# Patient Record
Sex: FEMALE | Race: WHITE
[De-identification: names, ages, dates, MRNs, and addresses within clinical notes are randomized per-mention and may not be internally consistent; named-entity substitution may affect disease eponyms.]

---

## 2018-06-07 ENCOUNTER — HOSPITAL ENCOUNTER (OUTPATIENT)
Dept: HOSPITAL 53 - M SFHCPLAZ | Age: 23
End: 2018-06-07
Attending: INTERNAL MEDICINE
Payer: MEDICARE

## 2018-06-07 DIAGNOSIS — Z53.8: ICD-10-CM

## 2018-06-07 DIAGNOSIS — M46.46: Primary | ICD-10-CM

## 2018-08-16 ENCOUNTER — HOSPITAL ENCOUNTER (OUTPATIENT)
Dept: HOSPITAL 53 - M SFHCPLAZ | Age: 23
End: 2018-08-16
Attending: INTERNAL MEDICINE
Payer: COMMERCIAL

## 2018-08-16 DIAGNOSIS — M46.46: Primary | ICD-10-CM

## 2018-08-16 LAB
ALBUMIN/GLOBULIN RATIO: 1.17 (ref 1–1.93)
ALBUMIN: 3.5 GM/DL (ref 3.2–5.2)
ALKALINE PHOSPHATASE: 116 U/L (ref 45–117)
ALT SERPL W P-5'-P-CCNC: 26 U/L (ref 12–78)
ANION GAP: 10 MEQ/L (ref 8–16)
AST SERPL-CCNC: 20 U/L (ref 7–37)
BASO #: 0 10^3/UL (ref 0–0.2)
BASO %: 0.5 % (ref 0–1)
BILIRUBIN,TOTAL: 0.1 MG/DL (ref 0.2–1)
BLOOD UREA NITROGEN: 9 MG/DL (ref 7–18)
CALCIUM LEVEL: 8.1 MG/DL (ref 8.5–10.1)
CARBON DIOXIDE LEVEL: 20 MEQ/L (ref 21–32)
CHLORIDE LEVEL: 114 MEQ/L (ref 98–107)
CREATININE FOR GFR: 0.79 MG/DL (ref 0.55–1.3)
CRP SERPL-MCNC: 0.75 MG/DL (ref 0–0.3)
EOS #: 0.5 10^3/UL (ref 0–0.5)
EOSINOPHIL NFR BLD AUTO: 6.8 % (ref 0–3)
ERYTHROCYTE SEDIMENTATION RATE: 10 MM/HR (ref 0–20)
GFR SERPL CREATININE-BSD FRML MDRD: > 60 ML/MIN/{1.73_M2} (ref 60–?)
GLUCOSE, FASTING: 99 MG/DL (ref 70–100)
HEMATOCRIT: 39.7 % (ref 36–47)
HEMOGLOBIN: 12.4 G/DL (ref 12–15.5)
IMMATURE GRANULOCYTE %: 0.4 % (ref 0–3)
LYMPH #: 2.2 10^3/UL (ref 1.5–6.5)
LYMPH %: 28.7 % (ref 24–44)
MEAN CORPUSCULAR HEMOGLOBIN: 26.4 PG (ref 27–33)
MEAN CORPUSCULAR HGB CONC: 31.2 G/DL (ref 32–36.5)
MEAN CORPUSCULAR VOLUME: 84.5 FL (ref 80–96)
MONO #: 0.4 10^3/UL (ref 0–0.8)
MONO %: 4.7 % (ref 0–5)
NEUTROPHILS #: 4.4 10^3/UL (ref 1.8–7.7)
NEUTROPHILS %: 58.9 % (ref 36–66)
NRBC BLD AUTO-RTO: 0 % (ref 0–0)
PLATELET COUNT, AUTOMATED: 222 10^3/UL (ref 150–450)
POTASSIUM SERUM: 3.6 MEQ/L (ref 3.5–5.1)
RED BLOOD COUNT: 4.7 10^6/UL (ref 4–5.4)
RED CELL DISTRIBUTION WIDTH: 15.3 % (ref 11.5–14.5)
SODIUM LEVEL: 144 MEQ/L (ref 136–145)
TOTAL PROTEIN: 6.5 GM/DL (ref 6.4–8.2)
WHITE BLOOD COUNT: 7.5 10^3/UL (ref 4–10)

## 2018-08-16 PROCEDURE — 80053 COMPREHEN METABOLIC PANEL: CPT

## 2022-06-26 ENCOUNTER — HISTORICAL (OUTPATIENT)
Dept: ADMINISTRATIVE | Facility: HOSPITAL | Age: 27
End: 2022-06-26

## 2022-07-06 ENCOUNTER — HISTORICAL (OUTPATIENT)
Dept: ADMINISTRATIVE | Facility: HOSPITAL | Age: 27
End: 2022-07-06

## 2022-07-09 ENCOUNTER — HOSPITAL ENCOUNTER (EMERGENCY)
Facility: HOSPITAL | Age: 27
Discharge: HOME | End: 2022-07-09
Attending: STUDENT IN AN ORGANIZED HEALTH CARE EDUCATION/TRAINING PROGRAM
Payer: MEDICAID

## 2022-07-09 VITALS
OXYGEN SATURATION: 98 % | HEIGHT: 62 IN | WEIGHT: 107.13 LBS | DIASTOLIC BLOOD PRESSURE: 78 MMHG | BODY MASS INDEX: 19.71 KG/M2 | RESPIRATION RATE: 16 BRPM | HEART RATE: 81 BPM | TEMPERATURE: 98 F | SYSTOLIC BLOOD PRESSURE: 134 MMHG

## 2022-07-09 DIAGNOSIS — N94.6 DYSMENORRHEA: Primary | ICD-10-CM

## 2022-07-09 LAB
ALBUMIN SERPL-MCNC: 4.3 GM/DL (ref 3.5–5)
ALBUMIN/GLOB SERPL: 1.4 RATIO (ref 1.1–2)
ALP SERPL-CCNC: 64 UNIT/L (ref 40–150)
ALT SERPL-CCNC: 12 UNIT/L (ref 0–55)
APPEARANCE UR: CLEAR
AST SERPL-CCNC: 15 UNIT/L (ref 5–34)
B-HCG SERPL QL: NEGATIVE
BACTERIA #/AREA URNS AUTO: ABNORMAL /HPF
BASOPHILS # BLD AUTO: 0.03 X10(3)/MCL (ref 0–0.2)
BASOPHILS NFR BLD AUTO: 0.4 %
BILIRUB UR QL STRIP.AUTO: NEGATIVE MG/DL
BILIRUBIN DIRECT+TOT PNL SERPL-MCNC: 0.6 MG/DL
BUN SERPL-MCNC: 8 MG/DL (ref 7–18.7)
CALCIUM SERPL-MCNC: 9.8 MG/DL (ref 8.4–10.2)
CHLORIDE SERPL-SCNC: 108 MMOL/L (ref 98–107)
CO2 SERPL-SCNC: 22 MMOL/L (ref 22–29)
COLOR UR AUTO: YELLOW
CREAT SERPL-MCNC: 0.66 MG/DL (ref 0.55–1.02)
EOSINOPHIL # BLD AUTO: 0.24 X10(3)/MCL (ref 0–0.9)
EOSINOPHIL NFR BLD AUTO: 3.1 %
ERYTHROCYTE [DISTWIDTH] IN BLOOD BY AUTOMATED COUNT: 11.5 % (ref 11.5–17)
GLOBULIN SER-MCNC: 3 GM/DL (ref 2.4–3.5)
GLUCOSE SERPL-MCNC: 95 MG/DL (ref 74–100)
GLUCOSE UR QL STRIP.AUTO: NEGATIVE MG/DL
HCT VFR BLD AUTO: 37.5 % (ref 37–47)
HGB BLD-MCNC: 12.6 GM/DL (ref 12–16)
IMM GRANULOCYTES # BLD AUTO: 0.02 X10(3)/MCL (ref 0–0.04)
IMM GRANULOCYTES NFR BLD AUTO: 0.3 %
KETONES UR QL STRIP.AUTO: ABNORMAL MG/DL
LEUKOCYTE ESTERASE UR QL STRIP.AUTO: NEGATIVE UNIT/L
LYMPHOCYTES # BLD AUTO: 2.8 X10(3)/MCL (ref 0.6–4.6)
LYMPHOCYTES NFR BLD AUTO: 36 %
MCH RBC QN AUTO: 30.7 PG (ref 27–31)
MCHC RBC AUTO-ENTMCNC: 33.6 MG/DL (ref 33–36)
MCV RBC AUTO: 91.5 FL (ref 80–94)
MONOCYTES # BLD AUTO: 0.67 X10(3)/MCL (ref 0.1–1.3)
MONOCYTES NFR BLD AUTO: 8.6 %
NEUTROPHILS # BLD AUTO: 4 X10(3)/MCL (ref 2.1–9.2)
NEUTROPHILS NFR BLD AUTO: 51.6 %
NITRITE UR QL STRIP.AUTO: NEGATIVE
PH UR STRIP.AUTO: 5.5 [PH]
PLATELET # BLD AUTO: 367 X10(3)/MCL (ref 130–400)
PMV BLD AUTO: 8.8 FL (ref 7.4–10.4)
POTASSIUM SERPL-SCNC: 3.4 MMOL/L (ref 3.5–5.1)
PROT SERPL-MCNC: 7.3 GM/DL (ref 6.4–8.3)
PROT UR QL STRIP.AUTO: NEGATIVE MG/DL
RBC # BLD AUTO: 4.1 X10(6)/MCL (ref 4.2–5.4)
RBC #/AREA URNS AUTO: ABNORMAL /HPF
RBC UR QL AUTO: ABNORMAL UNIT/L
SODIUM SERPL-SCNC: 140 MMOL/L (ref 136–145)
SP GR UR STRIP.AUTO: 1.01
SQUAMOUS #/AREA URNS AUTO: ABNORMAL /HPF
UROBILINOGEN UR STRIP-ACNC: 0.2 MG/DL
WBC # SPEC AUTO: 7.8 X10(3)/MCL (ref 4.5–11.5)
WBC #/AREA URNS AUTO: ABNORMAL /HPF

## 2022-07-09 PROCEDURE — 99285 EMERGENCY DEPT VISIT HI MDM: CPT | Mod: 25

## 2022-07-09 PROCEDURE — 81001 URINALYSIS AUTO W/SCOPE: CPT | Performed by: STUDENT IN AN ORGANIZED HEALTH CARE EDUCATION/TRAINING PROGRAM

## 2022-07-09 PROCEDURE — 63600175 PHARM REV CODE 636 W HCPCS: Performed by: STUDENT IN AN ORGANIZED HEALTH CARE EDUCATION/TRAINING PROGRAM

## 2022-07-09 PROCEDURE — 96372 THER/PROPH/DIAG INJ SC/IM: CPT | Performed by: STUDENT IN AN ORGANIZED HEALTH CARE EDUCATION/TRAINING PROGRAM

## 2022-07-09 PROCEDURE — 80053 COMPREHEN METABOLIC PANEL: CPT | Performed by: STUDENT IN AN ORGANIZED HEALTH CARE EDUCATION/TRAINING PROGRAM

## 2022-07-09 PROCEDURE — 81025 URINE PREGNANCY TEST: CPT | Performed by: STUDENT IN AN ORGANIZED HEALTH CARE EDUCATION/TRAINING PROGRAM

## 2022-07-09 PROCEDURE — 85025 COMPLETE CBC W/AUTO DIFF WBC: CPT | Performed by: STUDENT IN AN ORGANIZED HEALTH CARE EDUCATION/TRAINING PROGRAM

## 2022-07-09 PROCEDURE — 36415 COLL VENOUS BLD VENIPUNCTURE: CPT | Performed by: STUDENT IN AN ORGANIZED HEALTH CARE EDUCATION/TRAINING PROGRAM

## 2022-07-09 RX ORDER — KETOROLAC TROMETHAMINE 30 MG/ML
30 INJECTION, SOLUTION INTRAMUSCULAR; INTRAVENOUS
Status: COMPLETED | OUTPATIENT
Start: 2022-07-09 | End: 2022-07-09

## 2022-07-09 RX ADMIN — KETOROLAC TROMETHAMINE 30 MG: 30 INJECTION, SOLUTION INTRAMUSCULAR; INTRAVENOUS at 09:07

## 2022-07-09 NOTE — ED PROVIDER NOTES
Encounter Date: 7/9/2022       History     Chief Complaint   Patient presents with    Vaginal Bleeding     Pt c/o vaginal bleeding that started with spotting 3 days ago and now having heavy vaginal bleeding. Pt also has generalized body aches since last night.     The history is provided by the patient.   Vaginal Bleeding  This is a new problem. The current episode started yesterday. The problem occurs constantly. The problem has been gradually improving. Pertinent negatives include no chest pain, no abdominal pain, no headaches and no shortness of breath. Nothing aggravates the symptoms. Nothing relieves the symptoms.     26-year-old female with a past medical history tubal ligation presents emergency department for vaginal bleeding.  Patient states that she had a menstrual cycle approximately 4 weeks ago.  States that 3 days ago she started spotting and then passed a large clot/tissue this morning.  States that she has been having normal vaginal bleeding since then for a cycle for her.  States she has been using 1-2 pads per hour.  She also complains of generalized body aches but thinks it is because she is menstruating.  No fevers or chills.    Review of patient's allergies indicates:  No Known Allergies  No past medical history on file.  Past Surgical History:   Procedure Laterality Date    BILATERAL TUBAL LIGATION       No family history on file.     Review of Systems   Constitutional: Positive for fatigue. Negative for fever.   Respiratory: Negative for shortness of breath.    Cardiovascular: Negative for chest pain.   Gastrointestinal: Negative for abdominal pain.   Genitourinary: Positive for vaginal bleeding.   Neurological: Negative for headaches.   All other systems reviewed and are negative.      Physical Exam     Initial Vitals [07/09/22 0806]   BP Pulse Resp Temp SpO2   (!) 134/97 110 20 97.9 °F (36.6 °C) 100 %      MAP       --         Physical Exam    Nursing note and vitals  reviewed.  Constitutional: She appears well-developed and well-nourished.   Cardiovascular: Normal rate and regular rhythm.   Pulmonary/Chest: Breath sounds normal. No respiratory distress.   Abdominal: Abdomen is soft. Bowel sounds are normal.   Genitourinary: There is no rash or tenderness on the right labia. There is no tenderness, lesion or injury on the left labia.    Vaginal bleeding present.      No vaginal tenderness.   There is bleeding in the vagina. No tenderness in the vagina.    No signs of injury in the vagina.     Musculoskeletal:         General: No tenderness. Normal range of motion.     Neurological: She is alert.   Skin: Skin is warm. Capillary refill takes less than 2 seconds.   Psychiatric: She has a normal mood and affect. Thought content normal.         ED Course   Procedures  Labs Reviewed   URINALYSIS, REFLEX TO URINE CULTURE - Abnormal; Notable for the following components:       Result Value    Ketones, UA Trace (*)     Blood, UA Large (*)     All other components within normal limits   COMPREHENSIVE METABOLIC PANEL - Abnormal; Notable for the following components:    Potassium Level 3.4 (*)     Chloride 108 (*)     All other components within normal limits   CBC WITH DIFFERENTIAL - Abnormal; Notable for the following components:    RBC 4.10 (*)     All other components within normal limits   URINALYSIS, MICROSCOPIC - Abnormal; Notable for the following components:    RBC, UA 6-10 (*)     Squamous Epithelial Cells, UA Few (*)     All other components within normal limits   PREGNANCY TEST, URINE RAPID - Normal   CBC W/ AUTO DIFFERENTIAL    Narrative:     The following orders were created for panel order CBC auto differential.  Procedure                               Abnormality         Status                     ---------                               -----------         ------                     CBC with Differential[[id number]]        Abnormal            Final result                  Please view results for these tests on the individual orders.          Imaging Results          US Pelvis Complete Non OB (Final result)  Result time 22 11:49:26    Final result by Greg Zabala MD (22 11:49:26)                 Impression:      1. Nonspecific, small volume fluid within the endometrial canal.      Electronically signed by: Greg Zabala MD  Date:    2022  Time:    11:49             Narrative:    EXAMINATION:  US PELVIS COMPLETE NON OB    CLINICAL HISTORY:  AUB;    TECHNIQUE:  Multiple grayscale and color Doppler images of the pelvis were obtained utilizing the transabdominal and transvaginal technique.  Spectral Doppler evaluation of both ovaries was performed.    COMPARISON:  None    FINDINGS:  Uterus:    Anteverted measuring 7.5 x 3.6 x 4.5 cm.    Bilaminar thickness of the endometrium measures 2 mm    Small amount of fluid is present within the uterine cavity.    Uterine parenchyma appears normal.    Right ovary:    Measures 3.3 x 1.7 x 2.3 cm.    Normal sonographic appearance.    Spectral Doppler evaluation demonstrates normal arteriovenous waveforms.    Left ovary:    Measures 3.3 x 1.4 x 2.6 cm.    Normal sonographic appearance.    Spectral Doppler evaluation demonstrates normal arterial and venous waveforms.    Other:    No free fluid in the cul-de-sac.                                 Medications   ketorolac injection 30 mg (30 mg Intramuscular Given 22 0945)        Additional MDM:   Differential Diagnosis:   Symptom: Abdominal pain. <> The follow diagnoses were considered and will be evaluated: Dysfunction Uterine Bleeding, Dysmenorrhea, Endometriosis, Threatened  and Uterine Fibroids.               ED Course as of 22 1155   Sat 2022   1154 Patient resting comfortably in bed no acute distress.  Vital signs stable.  Ultrasound within normal limits.  Blood work okay.  Patient states pain is improved after Toradol.  Okay to discharge.   Follow-up with PCP. [BS]      ED Course User Index  [BS] Chaz Dan MD             Clinical Impression:   Final diagnoses:  [N94.6] Dysmenorrhea (Primary)          ED Disposition Condition    Discharge Stable        ED Prescriptions     None        Follow-up Information     Follow up With Specialties Details Why Contact Info    [facility] Schedule an appointment as soon as possible for a visit   [address] [address]  [phone]      [facility] - Emergency Dept Emergency Medicine Go to  If symptoms worsen [address] [facility] [id number] [phone]           Chaz Dan MD  07/09/22 8047

## 2022-08-18 LAB
PAP RECOMMENDATION EXT: NORMAL
PAP SMEAR: NORMAL

## 2023-01-13 ENCOUNTER — DOCUMENTATION ONLY (OUTPATIENT)
Dept: ADMINISTRATIVE | Facility: HOSPITAL | Age: 28
End: 2023-01-13
Payer: MEDICAID

## 2023-04-25 ENCOUNTER — HOSPITAL ENCOUNTER (OUTPATIENT)
Dept: RADIOLOGY | Facility: HOSPITAL | Age: 28
Discharge: HOME | End: 2023-04-25
Attending: NURSE PRACTITIONER
Payer: MEDICAID

## 2023-04-25 VITALS — BODY MASS INDEX: 21.26 KG/M2 | HEIGHT: 63 IN | WEIGHT: 120 LBS

## 2023-04-25 DIAGNOSIS — N63.21 BREAST LUMP ON LEFT SIDE AT 1 O'CLOCK POSITION: ICD-10-CM

## 2023-04-25 DIAGNOSIS — N63.11 BREAST LUMP ON RIGHT SIDE AT 10 O'CLOCK POSITION: ICD-10-CM

## 2023-04-25 PROCEDURE — 77062 BREAST TOMOSYNTHESIS BI: CPT | Mod: TC

## 2023-04-25 PROCEDURE — 76641 ULTRASOUND BREAST COMPLETE: CPT | Mod: TC,50

## 2023-10-14 ENCOUNTER — HOSPITAL ENCOUNTER (EMERGENCY)
Facility: HOSPITAL | Age: 28
Discharge: HOME | End: 2023-10-14
Attending: FAMILY MEDICINE
Payer: MEDICAID

## 2023-10-14 VITALS
WEIGHT: 118 LBS | BODY MASS INDEX: 20.91 KG/M2 | HEART RATE: 98 BPM | RESPIRATION RATE: 18 BRPM | DIASTOLIC BLOOD PRESSURE: 91 MMHG | SYSTOLIC BLOOD PRESSURE: 135 MMHG | HEIGHT: 63 IN | TEMPERATURE: 98 F | OXYGEN SATURATION: 99 %

## 2023-10-14 DIAGNOSIS — N30.01 ACUTE CYSTITIS WITH HEMATURIA: Primary | ICD-10-CM

## 2023-10-14 LAB
APPEARANCE UR: ABNORMAL
B-HCG SERPL QL: NEGATIVE
BACTERIA #/AREA URNS AUTO: ABNORMAL /HPF
BILIRUB UR QL STRIP.AUTO: NEGATIVE
COLOR UR AUTO: YELLOW
GLUCOSE UR QL STRIP.AUTO: NEGATIVE
KETONES UR QL STRIP.AUTO: NEGATIVE
LEUKOCYTE ESTERASE UR QL STRIP.AUTO: ABNORMAL
NITRITE UR QL STRIP.AUTO: NEGATIVE
PH UR STRIP.AUTO: 6 [PH]
PROT UR QL STRIP.AUTO: NEGATIVE
RBC #/AREA URNS AUTO: ABNORMAL /HPF
RBC UR QL AUTO: ABNORMAL
SP GR UR STRIP.AUTO: 1.02 (ref 1–1.03)
SQUAMOUS #/AREA URNS AUTO: ABNORMAL /HPF
UROBILINOGEN UR STRIP-ACNC: 0.2
WBC #/AREA URNS AUTO: ABNORMAL /HPF

## 2023-10-14 PROCEDURE — 81025 URINE PREGNANCY TEST: CPT | Performed by: NURSE PRACTITIONER

## 2023-10-14 PROCEDURE — 99283 EMERGENCY DEPT VISIT LOW MDM: CPT

## 2023-10-14 PROCEDURE — 87088 URINE BACTERIA CULTURE: CPT | Performed by: NURSE PRACTITIONER

## 2023-10-14 PROCEDURE — 87077 CULTURE AEROBIC IDENTIFY: CPT | Performed by: NURSE PRACTITIONER

## 2023-10-14 PROCEDURE — 81001 URINALYSIS AUTO W/SCOPE: CPT | Performed by: NURSE PRACTITIONER

## 2023-10-14 RX ORDER — NITROFURANTOIN 25; 75 MG/1; MG/1
100 CAPSULE ORAL 2 TIMES DAILY
Qty: 6 CAPSULE | Refills: 0 | Status: SHIPPED | OUTPATIENT
Start: 2023-10-14 | End: 2023-10-17

## 2023-10-14 NOTE — DISCHARGE INSTRUCTIONS
Take meds as directed  Increase water intake  Urinate after intercourse  Avoid bubble bathes  Women, wipe from to back after urinating

## 2023-10-14 NOTE — ED PROVIDER NOTES
Encounter Date: 10/14/2023       History     Chief Complaint   Patient presents with    Dysuria     Pt report intermittent burning during urination x 1 week. Denies any lower back pain or abnormal vaginal discharge.     The history is provided by the patient.   Dysuria   This is a new problem. The current episode started several days ago. The problem occurs every urination. The problem has been unchanged. The quality of the pain is described as burning. She is Sexually active. There is No history of pyelonephritis. Pertinent negatives include no frequency, no hematuria, no urgency and no flank pain. She has tried nothing for the symptoms.     Review of patient's allergies indicates:  No Known Allergies  History reviewed. No pertinent past medical history.  Past Surgical History:   Procedure Laterality Date    BILATERAL TUBAL LIGATION       Family History   Problem Relation Age of Onset    Breast cancer Paternal Aunt     Breast cancer Paternal Grandmother      Social History     Tobacco Use    Smoking status: Every Day     Types: Vaping with nicotine    Smokeless tobacco: Never   Substance Use Topics    Alcohol use: Not Currently    Drug use: Not Currently     Review of Systems   Constitutional:  Negative for fatigue.   Cardiovascular:  Negative for chest pain and palpitations.   Genitourinary:  Positive for dysuria. Negative for decreased urine volume, difficulty urinating, dyspareunia, flank pain, frequency, hematuria, pelvic pain and urgency.       Physical Exam     Initial Vitals [10/14/23 1758]   BP Pulse Resp Temp SpO2   (!) 135/91 98 18 97.6 °F (36.4 °C) 99 %      MAP       --         Physical Exam    Nursing note and vitals reviewed.  Constitutional: She appears well-developed and well-nourished. No distress.   Eyes: EOM are normal. Pupils are equal, round, and reactive to light.   Neck: Neck supple.   Normal range of motion.  Cardiovascular:  Normal rate and regular rhythm.     Exam reveals no gallop and no  friction rub.       No murmur heard.  Pulmonary/Chest: Breath sounds normal. No respiratory distress.   Abdominal: Abdomen is soft. Bowel sounds are normal. There is no abdominal tenderness. There is no rebound and no guarding.   Musculoskeletal:         General: Normal range of motion.      Cervical back: Normal range of motion and neck supple.     Neurological: She is alert and oriented to person, place, and time.   Skin: Skin is warm and dry.   Psychiatric: She has a normal mood and affect. Her behavior is normal. Judgment and thought content normal.         ED Course   Procedures  Labs Reviewed   URINALYSIS - Abnormal; Notable for the following components:       Result Value    Appearance, UA Cloudy (*)     Blood, UA Moderate (*)     Leukocyte Esterase, UA Moderate (*)     All other components within normal limits    Narrative:      URINE STABILITY IS 2 HOURS AT ROOM TEMP OR    SIX HOURS REFRIGERATED. PERFORMING TESTING ON    SPECIMENS GREATER THAN THIS AGE MAY AFFECT THE    FOLLOWING TESTS:    PH          SPECIFIC GRAVITY           BLOOD    CLARITY     BILIRUBIN               UROBILINOGEN   HCG QUALITATIVE URINE - Normal   URINALYSIS, MICROSCOPIC          Imaging Results    None          Medications - No data to display  Medical Decision Making  Amount and/or Complexity of Data Reviewed  Labs: ordered.               ED Course as of 10/14/23 1829   Sat Oct 14, 2023   1822 Urinalysis(!)  Blood and leukocytes in urine; no protein, glucose, ketones, or nitrites.    [HB]   1827 HCG Qualitative Urine  Negative upt   [HB]      ED Course User Index  [HB] DONNIE Villaseñor, SHONAP-C                    Clinical Impression:   Final diagnoses:  [N30.01] Acute cystitis with hematuria (Primary)        ED Disposition Condition    Discharge Stable          ED Prescriptions       Medication Sig Dispense Start Date End Date Auth. Provider    nitrofurantoin, macrocrystal-monohydrate, (MACROBID) 100 MG capsule Take 1 capsule (100 mg  total) by mouth 2 (two) times daily. for 3 days 6 capsule 10/14/2023 10/17/2023 DONNIE Villaseñor FNP-C          Follow-up Information       Follow up With Specialties Details Why Contact Info    Riya Abbasi FNP-C Family Medicine Call  As needed [address]  [facility] [address] [phone]      [facility] Emergency Medicine  If symptoms worsen [address] [facility] [address] [phone]             DONNIE Villaseñor FNP-C  10/14/23 0567

## 2023-10-17 LAB — BACTERIA UR CULT: ABNORMAL

## 2023-10-21 ENCOUNTER — HOSPITAL ENCOUNTER (EMERGENCY)
Facility: HOSPITAL | Age: 28
Discharge: HOME | End: 2023-10-21
Attending: INTERNAL MEDICINE
Payer: MEDICAID

## 2023-10-21 VITALS
HEART RATE: 89 BPM | SYSTOLIC BLOOD PRESSURE: 128 MMHG | OXYGEN SATURATION: 99 % | TEMPERATURE: 98 F | RESPIRATION RATE: 18 BRPM | DIASTOLIC BLOOD PRESSURE: 85 MMHG | BODY MASS INDEX: 21.41 KG/M2 | HEIGHT: 63 IN | WEIGHT: 120.81 LBS

## 2023-10-21 DIAGNOSIS — N39.0 URINARY TRACT INFECTION WITHOUT HEMATURIA, SITE UNSPECIFIED: ICD-10-CM

## 2023-10-21 DIAGNOSIS — K59.00 CONSTIPATION, UNSPECIFIED CONSTIPATION TYPE: Primary | ICD-10-CM

## 2023-10-21 LAB
ALBUMIN SERPL-MCNC: 3.8 G/DL (ref 3.5–5)
ALBUMIN/GLOB SERPL: 1.1 RATIO (ref 1.1–2)
ALP SERPL-CCNC: 66 UNIT/L (ref 40–150)
ALT SERPL-CCNC: 14 UNIT/L (ref 0–55)
APPEARANCE UR: CLEAR
AST SERPL-CCNC: 16 UNIT/L (ref 5–34)
B-HCG SERPL QL: NEGATIVE
BACTERIA #/AREA URNS AUTO: ABNORMAL /HPF
BASOPHILS # BLD AUTO: 0.03 X10(3)/MCL
BASOPHILS NFR BLD AUTO: 0.5 %
BILIRUB SERPL-MCNC: 0.4 MG/DL
BILIRUB UR QL STRIP.AUTO: NEGATIVE
BUN SERPL-MCNC: 12 MG/DL (ref 7–18.7)
CALCIUM SERPL-MCNC: 9.4 MG/DL (ref 8.4–10.2)
CHLORIDE SERPL-SCNC: 105 MMOL/L (ref 98–107)
CO2 SERPL-SCNC: 28 MMOL/L (ref 22–29)
COLOR UR AUTO: YELLOW
CREAT SERPL-MCNC: 0.79 MG/DL (ref 0.55–1.02)
EOSINOPHIL # BLD AUTO: 0.29 X10(3)/MCL (ref 0–0.9)
EOSINOPHIL NFR BLD AUTO: 4.4 %
ERYTHROCYTE [DISTWIDTH] IN BLOOD BY AUTOMATED COUNT: 12 % (ref 11.5–17)
GFR SERPLBLD CREATININE-BSD FMLA CKD-EPI: >60 MLS/MIN/1.73/M2
GLOBULIN SER-MCNC: 3.4 GM/DL (ref 2.4–3.5)
GLUCOSE SERPL-MCNC: 68 MG/DL (ref 74–100)
GLUCOSE UR QL STRIP.AUTO: NEGATIVE
HCT VFR BLD AUTO: 41.3 % (ref 37–47)
HGB BLD-MCNC: 13.8 G/DL (ref 12–16)
IMM GRANULOCYTES # BLD AUTO: 0.01 X10(3)/MCL (ref 0–0.04)
IMM GRANULOCYTES NFR BLD AUTO: 0.2 %
KETONES UR QL STRIP.AUTO: NEGATIVE
LEUKOCYTE ESTERASE UR QL STRIP.AUTO: ABNORMAL
LIPASE SERPL-CCNC: 12 U/L
LYMPHOCYTES # BLD AUTO: 2.97 X10(3)/MCL (ref 0.6–4.6)
LYMPHOCYTES NFR BLD AUTO: 45.1 %
MCH RBC QN AUTO: 31.5 PG (ref 27–31)
MCHC RBC AUTO-ENTMCNC: 33.4 G/DL (ref 33–36)
MCV RBC AUTO: 94.3 FL (ref 80–94)
MONOCYTES # BLD AUTO: 0.73 X10(3)/MCL (ref 0.1–1.3)
MONOCYTES NFR BLD AUTO: 11.1 %
NEUTROPHILS # BLD AUTO: 2.55 X10(3)/MCL (ref 2.1–9.2)
NEUTROPHILS NFR BLD AUTO: 38.7 %
NITRITE UR QL STRIP.AUTO: NEGATIVE
PH UR STRIP.AUTO: 5.5 [PH]
PLATELET # BLD AUTO: 341 X10(3)/MCL (ref 130–400)
PMV BLD AUTO: 8.4 FL (ref 7.4–10.4)
POTASSIUM SERPL-SCNC: 3.7 MMOL/L (ref 3.5–5.1)
PROT SERPL-MCNC: 7.2 GM/DL (ref 6.4–8.3)
PROT UR QL STRIP.AUTO: NEGATIVE
RBC # BLD AUTO: 4.38 X10(6)/MCL (ref 4.2–5.4)
RBC #/AREA URNS AUTO: ABNORMAL /HPF
RBC UR QL AUTO: ABNORMAL
SODIUM SERPL-SCNC: 139 MMOL/L (ref 136–145)
SP GR UR STRIP.AUTO: 1.01 (ref 1–1.03)
SQUAMOUS #/AREA URNS AUTO: ABNORMAL /HPF
UROBILINOGEN UR STRIP-ACNC: 1
WBC # SPEC AUTO: 6.58 X10(3)/MCL (ref 4.5–11.5)
WBC #/AREA URNS AUTO: ABNORMAL /HPF

## 2023-10-21 PROCEDURE — 85025 COMPLETE CBC W/AUTO DIFF WBC: CPT | Performed by: INTERNAL MEDICINE

## 2023-10-21 PROCEDURE — 81001 URINALYSIS AUTO W/SCOPE: CPT | Performed by: INTERNAL MEDICINE

## 2023-10-21 PROCEDURE — 83690 ASSAY OF LIPASE: CPT | Performed by: INTERNAL MEDICINE

## 2023-10-21 PROCEDURE — 80053 COMPREHEN METABOLIC PANEL: CPT | Performed by: INTERNAL MEDICINE

## 2023-10-21 PROCEDURE — 99284 EMERGENCY DEPT VISIT MOD MDM: CPT | Mod: 25

## 2023-10-21 PROCEDURE — 81025 URINE PREGNANCY TEST: CPT | Performed by: INTERNAL MEDICINE

## 2023-10-21 RX ORDER — BUPRENORPHINE HYDROCHLORIDE 8 MG/1
TABLET SUBLINGUAL
COMMUNITY
Start: 2023-09-18

## 2023-10-21 RX ORDER — CEFDINIR 300 MG/1
300 CAPSULE ORAL 2 TIMES DAILY
Qty: 14 CAPSULE | Refills: 0 | Status: SHIPPED | OUTPATIENT
Start: 2023-10-21 | End: 2023-10-28

## 2023-10-21 RX ORDER — POLYETHYLENE GLYCOL 3350 17 G/17G
17 POWDER, FOR SOLUTION ORAL DAILY
Qty: 595 G | Refills: 0 | Status: SHIPPED | OUTPATIENT
Start: 2023-10-21

## 2023-10-21 NOTE — ED PROVIDER NOTES
10/21/2023         1:19 AM    Source of History:  History obtained from the patient.     Chief complaint:  From Nurse Triage:  Abdominal Pain (Pt states that she has been having some sharp pains and felt like something was in her abdomen. Pt was seen at [facility] and they told her it may be a cyst and she had slight blood in her urine. Pt states that since yesterday she had been having a burning, acid reflux like sensation in her upper abdomen and felt like her throat burns. Pt denies pain during urination and doesn't notice blood in her urine. Also having frequent urinatin.)    HISTORY OF PRESENT ILLNES:  Christen Hawthorne is a 27 y.o. female  has a past medical history of Anxiety disorder, unspecified, Depression, and Fibromyalgia. presenting with Abdominal Pain (Pt states that she has been having some sharp pains and felt like something was in her abdomen. Pt was seen at [facility] and they told her it may be a cyst and she had slight blood in her urine. Pt states that since yesterday she had been having a burning, acid reflux like sensation in her upper abdomen and felt like her throat burns. Pt denies pain during urination and doesn't notice blood in her urine. Also having frequent urinatin.)      REVIEW OF SYSTEMS:   Constitutional symptoms:  No Fever. No Chills    Skin symptoms:  No Rash.    Eye symptoms:  No Visual disturbance reported.   ENMT symptoms:  No Sore throat, told burns   Respiratory symptoms:  No Shortness of Breath, no Cough, no Wheezing.    Cardiovascular symptoms:  No Chest Pain, No Palpitations.   Gastrointestinal symptoms:  Multiple areas where she has Abdominal Pain but does not last, it just comes and goes, makes her have burning sensation, and gas and then goes away., No Nausea, No Vomiting, No Diarrhea, No Constipation.    Genitourinary symptoms:  No Dysuria, frequency of micturition,  Musculoskeletal symptoms:  No Back pain,    Neurologic symptoms:  No Headache, No Dizziness.     Psychiatric symptoms:  No Anxiety, No Depression, No Substance Abuse.              Additional review of systems information: Patient Denies Any Other Complaints.    All Other Systems Reviewed With Patient And Negative.    ALLEGIES:  Review of patient's allergies indicates:  No Known Allergies    MEDICINE LIST:  Current Outpatient Medications   Medication Instructions    buprenorphine HCL (SUBUTEX) 8 mg Subl SMARTSI.5 Tablet(s) Sublingual Every Morning    cefdinir (OMNICEF) 300 mg, Oral, 2 times daily    polyethylene glycol (GLYCOLAX) 17 g, Oral, Daily        PMH:  As per HPI and below:    Reviewed and updated in chart.    PAST MEDICAL HISTORY:  Past Medical History:   Diagnosis Date    Anxiety disorder, unspecified     Depression     Fibromyalgia         PAST SURGICAL HISTORY:  Past Surgical History:   Procedure Laterality Date    BILATERAL TUBAL LIGATION         SOCIAL HISTORY:  Social History     Tobacco Use    Smoking status: Every Day     Types: Vaping with nicotine    Smokeless tobacco: Never   Substance Use Topics    Alcohol use: Not Currently    Drug use: Not Currently       FAMILY HISTORY:  Family History   Problem Relation Age of Onset    Hypothyroidism Mother     Hypertension Mother     Hypothyroidism Father     Breast cancer Paternal Aunt     Breast cancer Paternal Grandmother         PROBLEM LIST:  There is no problem list on file for this patient.       PHYSICAL EXAM:      ED Triage Vitals [10/21/23 0041]   /89   Pulse 97   Resp 18   Temp 97.6 °F (36.4 °C)   SpO2 100 %        Vital Signs: Reviewed As In Chart.  General:  Alert, No Cardiorespiratory Distress Noted.   Eye:  Extraocular Movements Are Intact.   ENT: Mucus membranes are moist.   Cardiovascular:  Regular Rate And Rhythm, No Murmur, No Pedal Edema.    Respiratory:  Respirations Nonlabored, No Respiratory Distress, Good Bilateral Air Entry, No Rales, No Rhonchi.    Gastrointestinal:  Soft, Non Distended, Non Tenderness, Normal Bowel  Sounds.    Neurological:  Alert And Oriented To Person, Place, Time, And Situation, Normal Motor Observed, Normal Speech Observed.  Musculoskeletal:  No Gross Deformity Noted.     Psychiatric:  Cooperative.      ED WORKUP FOR MEDICAL DECISION MAKING:    ED ORDERS:  Orders Placed This Encounter   Procedures    CT Renal Stone Study ABD Pelvis WO    Urinalysis, Reflex to Urine Culture    Pregnancy, urine rapid    Urinalysis, Microscopic    Comprehensive metabolic panel    CBC auto differential    Lipase    CBC with Differential       ED MEDICINES:  Medications - No data to display             ED LABS ORDERED AND REVIEWED:  Admission on 10/21/2023   Component Date Value Ref Range Status    Color, UA 10/21/2023 Yellow  Yellow, Light-Yellow, Dark Yellow, Fang, Straw Final    Appearance, UA 10/21/2023 Clear  Clear Final    Specific Gravity, UA 10/21/2023 1.015  1.005 - 1.030 Final    pH, UA 10/21/2023 5.5  5.0 - 8.5 Final    Protein, UA 10/21/2023 Negative  Negative Final    Glucose, UA 10/21/2023 Negative  Negative, Normal Final    Ketones, UA 10/21/2023 Negative  Negative Final    Blood, UA 10/21/2023 Trace-Intact (A)  Negative Final    Bilirubin, UA 10/21/2023 Negative  Negative Final    Urobilinogen, UA 10/21/2023 1.0  0.2, 1.0, Normal Final    Nitrites, UA 10/21/2023 Negative  Negative Final    Leukocyte Esterase, UA 10/21/2023 1+ (A)  Negative Final    Beta hCG Qualitative, Urine 10/21/2023 Negative  Negative Final    Bacteria, UA 10/21/2023 2+ (A)  None Seen, Rare, Occasional /HPF Final    RBC, UA 10/21/2023 0-2  None Seen, 0-2, 3-5, 0-5 /HPF Final    WBC, UA 10/21/2023 6-10 (A)  None Seen, 0-2, 3-5, 0-5 /HPF Final    Squamous Epithelial Cells, UA 10/21/2023 None Seen  None Seen, Rare, Occasional, Occ /HPF Final    Sodium Level 10/21/2023 139  136 - 145 mmol/L Final    Potassium Level 10/21/2023 3.7  3.5 - 5.1 mmol/L Final    Chloride 10/21/2023 105  98 - 107 mmol/L Final    Carbon Dioxide 10/21/2023 28  22 - 29  mmol/L Final    Glucose Level 10/21/2023 68 (L)  74 - 100 mg/dL Final    Blood Urea Nitrogen 10/21/2023 12.0  7.0 - 18.7 mg/dL Final    Creatinine 10/21/2023 0.79  0.55 - 1.02 mg/dL Final    Calcium Level Total 10/21/2023 9.4  8.4 - 10.2 mg/dL Final    Protein Total 10/21/2023 7.2  6.4 - 8.3 gm/dL Final    Albumin Level 10/21/2023 3.8  3.5 - 5.0 g/dL Final    Globulin 10/21/2023 3.4  2.4 - 3.5 gm/dL Final    Albumin/Globulin Ratio 10/21/2023 1.1  1.1 - 2.0 ratio Final    Bilirubin Total 10/21/2023 0.4  <=1.5 mg/dL Final    Alkaline Phosphatase 10/21/2023 66  40 - 150 unit/L Final    Alanine Aminotransferase 10/21/2023 14  0 - 55 unit/L Final    Aspartate Aminotransferase 10/21/2023 16  5 - 34 unit/L Final    eGFR 10/21/2023 >60  mls/min/1.73/m2 Final    Lipase Level 10/21/2023 12  <=60 U/L Final    WBC 10/21/2023 6.58  4.50 - 11.50 x10(3)/mcL Final    RBC 10/21/2023 4.38  4.20 - 5.40 x10(6)/mcL Final    Hgb 10/21/2023 13.8  12.0 - 16.0 g/dL Final    Hct 10/21/2023 41.3  37.0 - 47.0 % Final    MCV 10/21/2023 94.3 (H)  80.0 - 94.0 fL Final    MCH 10/21/2023 31.5 (H)  27.0 - 31.0 pg Final    MCHC 10/21/2023 33.4  33.0 - 36.0 g/dL Final    RDW 10/21/2023 12.0  11.5 - 17.0 % Final    Platelet 10/21/2023 341  130 - 400 x10(3)/mcL Final    MPV 10/21/2023 8.4  7.4 - 10.4 fL Final    Neut % 10/21/2023 38.7  % Final    Lymph % 10/21/2023 45.1  % Final    Mono % 10/21/2023 11.1  % Final    Eos % 10/21/2023 4.4  % Final    Basophil % 10/21/2023 0.5  % Final    Lymph # 10/21/2023 2.97  0.6 - 4.6 x10(3)/mcL Final    Neut # 10/21/2023 2.55  2.1 - 9.2 x10(3)/mcL Final    Mono # 10/21/2023 0.73  0.1 - 1.3 x10(3)/mcL Final    Eos # 10/21/2023 0.29  0 - 0.9 x10(3)/mcL Final    Baso # 10/21/2023 0.03  <=0.2 x10(3)/mcL Final    IG# 10/21/2023 0.01  0 - 0.04 x10(3)/mcL Final    IG% 10/21/2023 0.2  % Final       RADIOLOGY STUDIES ORDERED AND REVIEWED:  Imaging Results              CT Renal Stone Study ABD Pelvis WO (Preliminary result)   Result time 10/21/23 01:34:33      Preliminary result by Mitul Burns MD (10/21/23 01:34:33)                   Narrative:    START OF REPORT:  Technique: CT of the abdomen and pelvis was performed with axial images as well as sagittal and coronal reconstruction images without intravenous contrast.    Comparison: None available.    Clinical History: Flank pains.    Dosage Information: Automated Exposure Control was utilized 181.33 mGy.cm.    Findings:  Lines and Tubes: None.  Thorax:  Lungs: There is minimal nonspecific dependent change at the lung bases. No focal infiltrate or consolidation is seen.  Pleura: No effusions or thickening. No pneumothorax is seen.  Heart: The heart size is within normal limits.  Abdomen:  Abdominal Wall: A metallic density is noted at the region of the umbilicus. This may represent an umbilical piercing. The abdominal wall otherwise appears unremarkable.  Liver: The liver appears unremarkable.  Biliary System: No intrahepatic or extrahepatic biliary duct dilatation is seen.  Gallbladder: The gallbladder is non-distended and appears otherwise unremarkable.  Pancreas: The pancreas appears unremarkable.  Spleen: The spleen appears unremarkable.  Adrenals: The adrenal glands appear unremarkable.  Kidneys: The kidneys appear unremarkable with no stones cysts masses or hydronephrosis.  Aorta: Unremarkable abdominal aorta without specific evidence of aneurysm or dissection.  Bowel:  Esophagus: The visualized esophagus appears unremarkable.  Stomach: The stomach appears unremarkable.  Duodenum: Unremarkable appearing duodenum.  Small Bowel: The small bowel appears unremarkable.  Colon: There is moderate stool in the colon which could reflect an element of constipation. The large bowel otherwise appears unremarkable.  Appendix: The appendix is not identified but no inflammatory changes are seen in the right lower quadrant to suggest appendicitis.  Peritoneum: No intraperitoneal free air or  ascites is seen.    Pelvis:  Bladder: The bladder is nondistended but appears otherwise unremarkable.  Female:  Uterus: The uterus appears unremarkable for age.  Ovaries: No adnexal masses are seen.  Inguinal Findings: Incidental note is made of a few prominent inguinal lymph nodes bilaterally.    Bony structures:  Dorsal Spine: The visualized dorsal spine appears unremarkable.  Bony Pelvis: The visualized bony structures of the pelvis appear unremarkable.      Impression:  1. There is moderate stool in the colon which could reflect an element of constipation.  2. No definitive acute intraabdominal or pelvic solid organ or bowel pathology identified. Details and findings as discussed.                                        MEDICAL DECISION MAKING:    Christen Hawthorne is 27 y.o. female who  has a past medical history of Anxiety disorder, unspecified, Depression, and Fibromyalgia. arrives in ER with c/o Abdominal Pain (Pt states that she has been having some sharp pains and felt like something was in her abdomen. Pt was seen at [facility] and they told her it may be a cyst and she had slight blood in her urine. Pt states that since yesterday she had been having a burning, acid reflux like sensation in her upper abdomen and felt like her throat burns. Pt denies pain during urination and doesn't notice blood in her urine. Also having frequent urinatin.)      Reviewed Nurses Note. Reviewed Vital Signs.     Reviewed Pertinent old records, History and updated as necessary.    Vitals:    10/21/23 0226   BP: 128/85   Pulse: 89   Resp:    Temp:         Medical Decision Making  Amount and/or Complexity of Data Reviewed  Labs: ordered.  Radiology: ordered.    Risk  OTC drugs.  Prescription drug management.              ED Course as of 10/21/23 0240   Sat Oct 21, 2023   0234 Patient essentially has partially treated urinary tract infection with E coli which was sensitive to all the antibiotics as of last week, she only took 3  tablets of nitrofurantoin, [GQ]   0235 I will put her on cefdinir 300 mg twice a day for 7 days and let her go home with instruction to see her gyn doctor to do the Pap smear again, her Pap smear from last year was normal, the kidney stone which she had last year is not there anymore, she does have some constipation and that is what she says she has pain from 1 side to the other side and changing areas in the abdomen with some gas.  I will advise her to be on stool softener as she is on Suboxone which can be the cause of her constipation. [GQ]      ED Course User Index  [GQ] Bam Rios MD            PROCEDURES PERFORMED IN ED:  Procedures    DIAGNOSTIC IMPRESSION:        ICD-10-CM ICD-9-CM   1. Constipation, unspecified constipation type  K59.00 564.00   2. Urinary tract infection without hematuria, site unspecified  N39.0 599.0         ED Disposition Condition    Discharge Stable               Medication List        START taking these medications      cefdinir 300 MG capsule  Commonly known as: OMNICEF  Take 1 capsule (300 mg total) by mouth 2 (two) times daily. for 7 days     polyethylene glycol 17 gram/dose powder  Commonly known as: GLYCOLAX  Take 17 g by mouth once daily.            CONTINUE taking these medications      buprenorphine HCL 8 mg Subl  Commonly known as: SUBUTEX               Where to Get Your Medications        These medications were sent to [facility] CUR[facility] - [place] [address] [address]      Phone: [phone]   cefdinir 300 MG capsule  polyethylene glycol 17 gram/dose powder           Follow-up Information       Riya Abbasi FNP-C On 10/23/2023.    Specialty: Family Medicine  Contact information:  [address] [place] [id number]  [phone]                              ED Prescriptions       Medication Sig Dispense Start Date End Date Auth. Provider    cefdinir (OMNICEF) 300 MG capsule Take 1 capsule (300 mg total) by mouth 2 (two)  times daily. for 7 days 14 capsule 10/21/2023 10/28/2023 Bam Rios MD    polyethylene glycol (GLYCOLAX) 17 gram/dose powder Take 17 g by mouth once daily. 595 g 10/21/2023 -- Bam Rios MD          Follow-up Information       Follow up With Specialties Details Why Contact Info    Riya Abbasi FNP-C Family Medicine On 10/23/2023  [address]  [facility] [address]  [phone]                 Bam Rios MD  10/21/23 9963

## 2024-04-11 NOTE — PROGRESS NOTES
Chief Complaint:     Chief Complaint   Patient presents with    Multidisciplinary Discussion     Discuss pap results from PCP         HPI:   28 y.o.  presents to discuss pap smear results collected 2024 aakash Sanchez NP, denies hx abnormal pap. S/p tubal ligation. C/o irregular cycles. Reports mid cycle irregular bleeding x 3 months that varies from scant to heavy flow c mild-moderate cramping.     2024 pap smear results to follow:   LSIL +HPV -16 -18/45 -GC/CT/TV      LMP: 2024  Frequency: irregular  Cycle length: 5-7 days  Flow: heavy  Intermenstrual bleeding: Yes  Postcoital bleeding: No  Dysmenorrhea: Yes, mild  Sexually active: yes  Dyspareunia: No  Contraception: No, tubal  H/o STI: HPV  Last pap: 2024, +HPV  H/o abnl pap: No  Colposcopy: n/a  Gardasil: n/a  MM2023 benign  H/o abnl MMG: yes  Colonoscopy: n/a         Current Outpatient Medications:     buprenorphine HCL (SUBUTEX) 8 mg Subl, SMARTSI.5 Tablet(s) Sublingual Every Morning, Disp: , Rfl:     citalopram (CELEXA) 10 MG tablet, Take 10 mg by mouth once daily., Disp: , Rfl:     SUBOXONE 8-2 mg, SMARTSI.5 Strip(s) Sublingual Every Morning, Disp: , Rfl:     VENTOLIN HFA 90 mcg/actuation inhaler, Inhale 2 puffs into the lungs., Disp: , Rfl:     polyethylene glycol (GLYCOLAX) 17 gram/dose powder, Take 17 g by mouth once daily. (Patient not taking: Reported on 2024), Disp: 595 g, Rfl: 0    Review of patient's allergies indicates:  No Known Allergies    Social History     Tobacco Use    Smoking status: Every Day     Types: Vaping with nicotine     Passive exposure: Current    Smokeless tobacco: Never   Substance Use Topics    Alcohol use: Not Currently    Drug use: Not Currently       Review of Systems:   General/Constitutional: Chills denies. Fatigue/weakness denies. Fever denies. Night sweats denies. Hot flashes denies    Respiratory: Cough denies. Hemoptysis denies. SOB denies. Sputum production denies.  "Wheezing denies .   Cardiovascular: Chest pain denies . Dizziness denies. Palpitations denies. Swelling in hands/feet denies    Gastrointestinal: Abdominal pain denies. Blood in stool denies. Constipation denies. Diarrhea denies. Heartburn denies. Nausea denies. Vomiting denies    Genitourinary: Incontinence denies. Blood in urine denies. Frequent urination denies. Painful urination denies. Urinary urgency denies. Nocturia denies    Gynecologic: Irregular menses denies. Heavy bleeding denies. Painful menses denies. Vaginal discharge denies. Vaginal odor denies. Vaginal itching denies. Vaginal lesion denies. Pelvic pain denies. Decreased libido denies. Vulvar lesion denies. Prolapse of genital organs denies. Painful intercourse denies. Postcoital bleeding denies    Psychiatric: Depression denies. Anxiety denies       Physical Exam:   Vitals:    04/17/24 1342   BP: 118/70   BP Location: Left arm   Patient Position: Sitting   BP Method: Medium (Manual)   Temp: 97.2 °F (36.2 °C)   TempSrc: Temporal   Weight: 52.8 kg (116 lb 6.4 oz)   Height: 5' 3" (1.6 m)       Body mass index is 20.62 kg/m².    Chaperone: present.         General appearance: healthy, well-nourished and well-developed     Psychiatric: Orientation to time, place and person. Normal mood and affect and active, alert     Skin:   Appearance: no rashes or lesions.     Neck:   Neck: supple, FROM, trachea midline. and no masses   Thyroid: no enlargement or nodules and non-tender.       Cardiovascular:   Auscultation: RRR and no murmur.   Peripheral Vascular: no varicosities, LLE edema, RLE edema, calf tenderness, and palpable cord and pedal pulses intact.     Lungs:   Respiratory effort: no intercostal retractions or accessory muscle usage.   Auscultation: no wheezing, rales/crackles, or rhonchi and clear to auscultation.     Breast:   Inspection/Palpation: no tenderness, discrete/distinct masses, skin changes, or abnormal secretions. Nipple appearance normal. " Bilateral +FBC. Tenderness of lymph node L axilla    Abdomen:   Auscultation/Inspection/Palpation: no hepatomegaly, splenomegaly, masses, tenderness or CVA tenderness and soft, non-distended bowel sounds preset.    Hernia: no palpable hernias.     Female Genitalia:   Vulva: no masses, tenderness or lesions   Bladder/Urethra: no urethral discharge or mass, normal meatus, bladder non-distended.   Vagina: no tenderness, erythema, cystocele, rectocele, or vesicle(s) or ulcers. +Whiff +thin discharge  Cervix: no discharge, no cervical lacerations noted or motion tenderness and grossly normal   Uterus: normal size and shape and midline, non-tender, and no uterine prolapse.   Adnexa/Parametria: no parametrial tenderness or mass, no adnexal tenderness or ovarian mass.     Lymph Nodes:   Palpation: non tender submandibular nodes, axillary nodes, or inguinal nodes.     Rectal Exam:   Rectum: normal perianal skin.        Assessment:     There is no problem list on file for this patient.      Health Maintenance Due   Topic Date Due    Lipid Panel  Never done    Pneumococcal Vaccines (Age 0-64) (1 of 2 - PCV) Never done    HIV Screening  Never done    Influenza Vaccine (1) 09/01/2023    COVID-19 Vaccine (1 - 2023-24 season) Never done     Health Maintenance Topics with due status: Not Due       Topic Last Completion Date    TETANUS VACCINE 08/21/2015    Cervical Cancer Screening 08/18/2022           Plan:    Christen was seen today for multidisciplinary discussion.    Diagnoses and all orders for this visit:    LGSIL on Pap smear of cervix  RTC for colposcopy c Dr Fernández.     High risk HPV infection  - HPV is a common viral infection that manifests in some patients as anogenital warts.    - HPV infection is acquired through direct genital contact.   - Educated she may be at risk for other sexually transmitted diseases   - Advised pt on Gardasil vaccine and correct doses to be administered if pt desires.     Dysmenorrhea  One Swab  Myco/ureaplasma    - Educated  - NSAIDs, heating pad, warm bath  - Pain precautions    Abnormal uterine bleeding (AUB)  One Swab myco/ureaplasma    As long as bleeding is not heavy, painful, or bothersome, it does not require treatment at this time.  Will continue to monitor bleeding.  Recommend menstrual diary.  Call if symptoms worsens.      Fibrocystic breast changes, bilateral  - Advised pt to decrease caffeine intake (e.g. soda, coffee, tea, chocolate, etc.)  - Advised pt to use Aspercreme PRN.     Axillary mass, left  L breast US    RTC 2 week to discuss US and culture results.    HPV vaccine counseling  - HPV is a common viral infection that manifests in some patients as anogenital warts.   - HPV infection is acquired through direct genital contact.   - Educated she may be at risk for other sexually transmitted diseases   - Advised pt on Gardasil vaccine and correct doses to be administered if pt desires.       This note was transcribed by Johanna Guy MA. There may be transcription errors as a result, however minimal. Effort has been made to ensure accuracy of transcription, but any obvious errors or omissions should be clarified with the author of the document.

## 2024-04-17 ENCOUNTER — OFFICE VISIT (OUTPATIENT)
Dept: OBSTETRICS AND GYNECOLOGY | Facility: CLINIC | Age: 29
End: 2024-04-17
Payer: MEDICAID

## 2024-04-17 VITALS
WEIGHT: 116.38 LBS | HEIGHT: 63 IN | BODY MASS INDEX: 20.62 KG/M2 | SYSTOLIC BLOOD PRESSURE: 118 MMHG | DIASTOLIC BLOOD PRESSURE: 70 MMHG | TEMPERATURE: 97 F

## 2024-04-17 DIAGNOSIS — N93.9 ABNORMAL UTERINE BLEEDING (AUB): ICD-10-CM

## 2024-04-17 DIAGNOSIS — N60.12 FIBROCYSTIC BREAST CHANGES, BILATERAL: ICD-10-CM

## 2024-04-17 DIAGNOSIS — N60.11 FIBROCYSTIC BREAST CHANGES, BILATERAL: ICD-10-CM

## 2024-04-17 DIAGNOSIS — N94.6 DYSMENORRHEA: ICD-10-CM

## 2024-04-17 DIAGNOSIS — B96.89 BV (BACTERIAL VAGINOSIS): ICD-10-CM

## 2024-04-17 DIAGNOSIS — R22.32 AXILLARY MASS, LEFT: ICD-10-CM

## 2024-04-17 DIAGNOSIS — B97.7 HIGH RISK HPV INFECTION: ICD-10-CM

## 2024-04-17 DIAGNOSIS — Z71.85 HPV VACCINE COUNSELING: ICD-10-CM

## 2024-04-17 DIAGNOSIS — R87.612 LGSIL ON PAP SMEAR OF CERVIX: Primary | ICD-10-CM

## 2024-04-17 DIAGNOSIS — N76.0 BV (BACTERIAL VAGINOSIS): ICD-10-CM

## 2024-04-17 PROCEDURE — 3078F DIAST BP <80 MM HG: CPT | Mod: CPTII,,, | Performed by: NURSE PRACTITIONER

## 2024-04-17 PROCEDURE — 99214 OFFICE O/P EST MOD 30 MIN: CPT | Mod: ,,, | Performed by: NURSE PRACTITIONER

## 2024-04-17 PROCEDURE — 3074F SYST BP LT 130 MM HG: CPT | Mod: CPTII,,, | Performed by: NURSE PRACTITIONER

## 2024-04-17 PROCEDURE — 1159F MED LIST DOCD IN RCRD: CPT | Mod: CPTII,,, | Performed by: NURSE PRACTITIONER

## 2024-04-17 PROCEDURE — 1160F RVW MEDS BY RX/DR IN RCRD: CPT | Mod: CPTII,,, | Performed by: NURSE PRACTITIONER

## 2024-04-17 PROCEDURE — 3008F BODY MASS INDEX DOCD: CPT | Mod: CPTII,,, | Performed by: NURSE PRACTITIONER

## 2024-04-17 RX ORDER — BUPRENORPHINE HYDROCHLORIDE, NALOXONE HYDROCHLORIDE 8; 2 MG/1; MG/1
FILM, SOLUBLE BUCCAL; SUBLINGUAL
COMMUNITY
Start: 2024-03-28

## 2024-04-17 RX ORDER — METRONIDAZOLE 500 MG/1
500 TABLET ORAL EVERY 12 HOURS
Qty: 14 TABLET | Refills: 0 | Status: SHIPPED | OUTPATIENT
Start: 2024-04-17 | End: 2024-04-24

## 2024-04-17 RX ORDER — CITALOPRAM 10 MG/1
10 TABLET ORAL DAILY
COMMUNITY

## 2024-04-17 RX ORDER — ALBUTEROL SULFATE 90 UG/1
2 AEROSOL, METERED RESPIRATORY (INHALATION)
COMMUNITY
Start: 2023-12-06

## 2024-05-01 ENCOUNTER — TELEPHONE (OUTPATIENT)
Dept: OBSTETRICS AND GYNECOLOGY | Facility: CLINIC | Age: 29
End: 2024-05-01
Payer: MEDICAID

## 2024-05-01 DIAGNOSIS — N60.12 FIBROCYSTIC DISEASE OF LEFT BREAST: ICD-10-CM

## 2024-05-01 DIAGNOSIS — N60.11 FIBROCYSTIC DISEASE OF RIGHT BREAST: ICD-10-CM

## 2024-05-01 DIAGNOSIS — N63.20 MASS OF LEFT BREAST, UNSPECIFIED QUADRANT: ICD-10-CM

## 2024-05-01 DIAGNOSIS — R22.32 AXILLARY MASS, LEFT: Primary | ICD-10-CM

## 2024-05-01 NOTE — TELEPHONE ENCOUNTER
Bilateral MMG and (L) Breast US scheduled with [facility] for 5/29/24 @ 12:30pm.  Orders faxed to facility.  Patient notified of appointment and reminded to bring ID and insurance card.  Patient voiced understanding and agrees with plan of care.

## 2024-05-01 NOTE — TELEPHONE ENCOUNTER
----- Message from Dana Potts sent at 5/1/2024  8:11 AM CDT -----  Regarding: Patient Message  Contact: Patient  Patient called stating she has not done her US & Diag. Mammogram due to [facility] not having an opening till June/July. She asked if there is a different location she can have it done that could get her in sooner. Patient has an appt. on 5/2/24 for results. She will cancel it due to not having orders done.   [phone]

## 2024-06-19 NOTE — PROGRESS NOTES
"PROCEDURE NOTE    Procedure: Colposcopy    Pre-Procedure Care:   The correct patient was identified. The procedure and risks were explained. Questions were answered. Written informed consent was obtained. The correct side and site were confirmed.    Colposcopy    Indication for Procedure:   28 y.o. female  presents for colposcopy secondary to LSIL +HPV -16 -18/45 -GC/CT/TV  PAP on 3/5/24.     Denies any abnormal VB/VD/pelvic/abdominal pain.     Gyn History:    Menstrual History   Cycle: Yes  Menarche Age: 11 years  Flow Duration:  (Bleeding X3 weeks)  Flow: Normal  Interval: 28 (28)  Intermenstrual Bleeding: Yes  Dysmenorrhea: No  [place]  Sexually Active: Yes  Sexual Orientation: heterosexual  Postcoital Bleeding: Yes  Dyspareunia: Yes  STI History: Yes  STI Type:  (HPV)  Contraception: Yes  Contraception Type: Tubal ligation/salpingectony  Menopause  Menopause Age: 1 years  Breast History  Last Breast Imaging Date: No  History of Breast Biopsy: No  Pap History   Last pap date: 03/15/24  Result: (!) Abnormal  History of Abnormal Pap: (!) Yes  HPV Vaccine Completed: No        OB History    Para Term  AB Living   2 2 2     2   SAB IAB Ectopic Multiple Live Births           2      # Outcome Date GA Lbr Martin/2nd Weight Sex Type Anes PTL Lv   2 Term 11/04/15   2.722 kg (6 lb) F Vag-Spont EPI  DESIRAE      Complications:  labor in third trimester   1 Term 11   3.447 kg (7 lb 9.6 oz) M Vag-Spont EPI  DESIRAE       /68   Ht 5' 3" (1.6 m)   Wt 52 kg (114 lb 10.2 oz)   LMP  (LMP Unknown) Comment: Bleeding X3 weeks  BMI 20.31 kg/m²       Procedure in Detail:   Constitutional: General appearance: healthy, well-nourished and well-developed  Female Genitalia:  Vulva: no masses, atrophy or lesions  Bladder/Urethra: no urethral discharge or mass, normal meatus, bladder   non-distended.  Vagina: no tenderness, erythema, cystocele, rectocele, abnormal  vaginal discharge, or vesicle(s) or ulcers "   Cervix: no discharge or cervical motion tenderness and grossly normal    Colposcopy procedure fully reviewed. Patient questions regarding procedure and diagnosis answered. Consent was signed. A speculum was placed into the vagina. The cervix and vagina were painted with acetic acid solution.     The entire T zone was visualized.    Colposcopy revealed the following:  Acetowhite lesions absent  Mosaicism absent  Punctate lesions absent  Abnormal vessels absent  Leukoplakia absent  A cervical biopsy was not performed.   An ECC was performed.  Hemostasis observed  Patient tolerated procedure well.      Condition:   Stable. Patient tolerated procedure well.     Complications: None      Assessment:   1. LGSIL on Pap smear of cervix [R87.612]  -     Specimen to Pathology Gynecology and Obstetrics    2. High risk HPV infection [B97.7]  -     Specimen to Pathology Gynecology and Obstetrics    3. Tobacco use          Plan:     Consent given, ECC preformed, tolerated well.  - Advised that mild vaginal discharge may occur for 24hrs and spotting for 48hrs.  - Patient to report passage of clots, onset of profuse bleeding, foul vaginal odor, fever and/or pelvic pain.    If ROSEANNE I or benign will repeat PAP with HPV cotesting in 12 months. If ROSEANNE II or ROSEANNE III, will RTC for further discussion and POC.     Smoking cessation counseling    RTC pending results     This note was transcribed by Ekta Morin. There may be transcription errors as a result, however minimal. Effort has been made to ensure accuracy of transcription, but any obvious errors or omissions should be clarified with the author of the document.

## 2024-06-20 ENCOUNTER — OFFICE VISIT (OUTPATIENT)
Dept: OBSTETRICS AND GYNECOLOGY | Facility: CLINIC | Age: 29
End: 2024-06-20
Payer: MEDICAID

## 2024-06-20 VITALS
HEIGHT: 63 IN | WEIGHT: 114.63 LBS | BODY MASS INDEX: 20.31 KG/M2 | SYSTOLIC BLOOD PRESSURE: 118 MMHG | DIASTOLIC BLOOD PRESSURE: 68 MMHG

## 2024-06-20 DIAGNOSIS — B97.7 HIGH RISK HPV INFECTION: ICD-10-CM

## 2024-06-20 DIAGNOSIS — Z72.0 TOBACCO USE: ICD-10-CM

## 2024-06-20 DIAGNOSIS — R87.612 LGSIL ON PAP SMEAR OF CERVIX: Primary | ICD-10-CM

## 2024-06-20 PROCEDURE — 99499 UNLISTED E&M SERVICE: CPT | Mod: ,,, | Performed by: OBSTETRICS & GYNECOLOGY

## 2024-06-20 PROCEDURE — 57456 ENDOCERV CURETTAGE W/SCOPE: CPT | Mod: ,,, | Performed by: OBSTETRICS & GYNECOLOGY

## 2024-06-26 ENCOUNTER — HOSPITAL ENCOUNTER (EMERGENCY)
Facility: HOSPITAL | Age: 29
Discharge: HOME | End: 2024-06-26
Payer: MEDICAID

## 2024-06-26 VITALS
TEMPERATURE: 98 F | SYSTOLIC BLOOD PRESSURE: 132 MMHG | HEART RATE: 95 BPM | DIASTOLIC BLOOD PRESSURE: 78 MMHG | HEIGHT: 63 IN | BODY MASS INDEX: 20.38 KG/M2 | RESPIRATION RATE: 16 BRPM | WEIGHT: 115 LBS | OXYGEN SATURATION: 100 %

## 2024-06-26 DIAGNOSIS — F11.20 OPIOID DEPENDENCE ON AGONIST THERAPY: Primary | ICD-10-CM

## 2024-06-26 PROCEDURE — 99281 EMR DPT VST MAYX REQ PHY/QHP: CPT

## 2024-06-26 RX ORDER — BUPRENORPHINE HYDROCHLORIDE AND NALOXONE HYDROCHLORIDE DIHYDRATE 8; 2 MG/1; MG/1
1 TABLET SUBLINGUAL 3 TIMES DAILY
Qty: 21 TABLET | Refills: 0 | Status: SHIPPED | OUTPATIENT
Start: 2024-06-26 | End: 2024-07-03

## 2024-06-26 NOTE — ED PROVIDER NOTES
Encounter Date: 6/26/2024       History     Chief Complaint   Patient presents with    Medication Refill     Reports that she talked to kelsi and she told her to come in and get her rx refill for the bridge program and to follow up with kelsi when she leaves. Kelsi instructed to give her business card to pt and have her call her when she leaves to set up follow up with dr kendrick.     28 year old female presents with opioid addiction and seeking the suboxone bridge program.  Seen by a provider in [place], too far to drive and seeking provider closer to where she lives and needs suboxone until seen.    The history is provided by the patient. No  was used.     Review of patient's allergies indicates:  No Known Allergies  Past Medical History:   Diagnosis Date    Anxiety disorder, unspecified     Depression     Fibromyalgia      Past Surgical History:   Procedure Laterality Date    BILATERAL TUBAL LIGATION       Family History   Problem Relation Name Age of Onset    Breast cancer Paternal Grandmother          age unknown    Hypothyroidism Father      Hypothyroidism Mother      Hypertension Mother      Breast cancer Paternal Aunt  30        30s per Pt    Cervical cancer Paternal Cousin  20        20s per Pt    Colon cancer Neg Hx      Ovarian cancer Neg Hx      Uterine cancer Neg Hx       Social History     Tobacco Use    Smoking status: Every Day     Types: Cigarettes     Passive exposure: Current    Smokeless tobacco: Never   Substance Use Topics    Alcohol use: Not Currently    Drug use: Not Currently     Review of Systems   All other systems reviewed and are negative.      Physical Exam     Initial Vitals [06/26/24 1350]   BP Pulse Resp Temp SpO2   132/78 95 16 98.1 °F (36.7 °C) 100 %      MAP       --         Physical Exam    Nursing note and vitals reviewed.          ED Course   Procedures  Labs Reviewed - No data to display       Imaging Results    None          Medications - No data  to display  Medical Decision Making  Problems Addressed:  Opioid dependence on agonist therapy: acute illness or injury    Risk  Prescription drug management.                                      Clinical Impression:  Final diagnoses:  [F11.20] Opioid dependence on agonist therapy (Primary)          ED Disposition Condition    Discharge Stable          ED Prescriptions       Medication Sig Dispense Start Date End Date Auth. Provider    buprenorphine-naloxone 8-2 (SUBOXONE) 8-2 mg Subl Place 1 tablet (8 mg total) under the tongue 3 (three) times daily. for 7 days 21 tablet 6/26/2024 7/3/2024 Lucero Fuchs FNP          Follow-up Information       Follow up With Specialties Details Why Contact Info    Riya Abbasi FNP-C Family Medicine In 3 days If symptoms worsen [address]  [facility] [id number] [phone]               Lucero Fuchs FNP  06/26/24 7271

## 2024-06-27 ENCOUNTER — TELEPHONE (OUTPATIENT)
Dept: OBSTETRICS AND GYNECOLOGY | Facility: CLINIC | Age: 29
End: 2024-06-27
Payer: MEDICAID

## 2024-07-10 ENCOUNTER — TELEPHONE (OUTPATIENT)
Dept: OBSTETRICS AND GYNECOLOGY | Facility: CLINIC | Age: 29
End: 2024-07-10
Payer: MEDICAID

## 2024-07-10 NOTE — TELEPHONE ENCOUNTER
----- Message from Dana Potts sent at 7/10/2024  2:33 PM CDT -----  Regarding: Results  Contact: Patient  Patient requesting colpo results   [phone]

## 2024-08-13 ENCOUNTER — HOSPITAL ENCOUNTER (EMERGENCY)
Facility: HOSPITAL | Age: 29
Discharge: HOME | End: 2024-08-13
Payer: MEDICAID

## 2024-08-13 VITALS
DIASTOLIC BLOOD PRESSURE: 99 MMHG | WEIGHT: 115 LBS | TEMPERATURE: 98 F | BODY MASS INDEX: 20.38 KG/M2 | SYSTOLIC BLOOD PRESSURE: 129 MMHG | HEART RATE: 112 BPM | RESPIRATION RATE: 18 BRPM | HEIGHT: 63 IN | OXYGEN SATURATION: 99 %

## 2024-08-13 DIAGNOSIS — F19.20 DRUG ADDICTION: Primary | ICD-10-CM

## 2024-08-13 PROCEDURE — 99283 EMERGENCY DEPT VISIT LOW MDM: CPT

## 2024-08-13 PROCEDURE — 63600175 PHARM REV CODE 636 W HCPCS

## 2024-08-13 RX ORDER — BUPRENORPHINE HYDROCHLORIDE 8 MG/1
8 TABLET SUBLINGUAL DAILY
Qty: 12 TABLET | Refills: 0 | Status: SHIPPED | OUTPATIENT
Start: 2024-08-13 | End: 2024-08-14 | Stop reason: SDUPTHER

## 2024-08-13 RX ORDER — BUPRENORPHINE HYDROCHLORIDE AND NALOXONE HYDROCHLORIDE DIHYDRATE 8; 2 MG/1; MG/1
8 TABLET SUBLINGUAL ONCE
Status: COMPLETED | OUTPATIENT
Start: 2024-08-13 | End: 2024-08-13

## 2024-08-13 RX ADMIN — BUPRENORPHINE AND NALOXONE 8 MG: 8; 2 TABLET SUBLINGUAL at 02:08

## 2024-08-13 NOTE — ED PROVIDER NOTES
"Encounter Date: 8/13/2024       History     Chief Complaint   Patient presents with    Addiction Problem     Arrives with c/o "body ache, chills and agitation" after not taking subutex x1 day. Reports having had an appt on Friday, missed appt, and now she needs doctor to write a script for subutex until she is able to get another appt. Using bridge program. States that she gets subutex usually from a friend.     28-year-old female presents requesting prescription for Subutex.  She says she is in withdrawal.  She reports taking Subutex "from a friend".  The last prescription on file on her  is from June, and is also from the ER.        Review of patient's allergies indicates:  No Known Allergies  Past Medical History:   Diagnosis Date    Anxiety disorder, unspecified     Depression     Fibromyalgia      Past Surgical History:   Procedure Laterality Date    BILATERAL TUBAL LIGATION       Family History   Problem Relation Name Age of Onset    Breast cancer Paternal Grandmother          age unknown    Hypothyroidism Father      Hypothyroidism Mother      Hypertension Mother      Breast cancer Paternal Aunt  30        30s per Pt    Cervical cancer Paternal Cousin  20        20s per Pt    Colon cancer Neg Hx      Ovarian cancer Neg Hx      Uterine cancer Neg Hx       Social History     Tobacco Use    Smoking status: Every Day     Types: Cigarettes     Passive exposure: Current    Smokeless tobacco: Never   Substance Use Topics    Alcohol use: Not Currently    Drug use: Not Currently     Review of Systems   Constitutional:  Negative for fever.   HENT:  Negative for sore throat.    Respiratory:  Negative for shortness of breath.    Cardiovascular:  Negative for chest pain.   Gastrointestinal:  Negative for nausea.   Genitourinary:  Negative for dysuria.   Musculoskeletal:  Positive for myalgias. Negative for back pain.   Skin:  Negative for rash.   Neurological:  Negative for weakness.   Hematological:  Does not " bruise/bleed easily.   Psychiatric/Behavioral:  The patient is nervous/anxious.    All other systems reviewed and are negative.      Physical Exam     Initial Vitals [08/13/24 0231]   BP Pulse Resp Temp SpO2   (!) 152/98 97 18 98.2 °F (36.8 °C) 99 %      MAP       --         Physical Exam    Nursing note and vitals reviewed.  Constitutional: Vital signs are normal. She appears well-developed and well-nourished. She is cooperative.   HENT:   Head: Normocephalic and atraumatic.   Mouth/Throat: Oropharynx is clear and moist.   Eyes: Conjunctivae, EOM and lids are normal. Pupils are equal, round, and reactive to light.   Neck: Trachea normal. Neck supple.   Normal range of motion.  Cardiovascular:  Regular rhythm, normal heart sounds and intact distal pulses.           Musculoskeletal:      Cervical back: Normal, normal range of motion and neck supple.      Lumbar back: Normal.     Neurological: She is alert and oriented to person, place, and time. She has normal strength. Coordination normal.   Skin: Skin is warm, dry and intact. Capillary refill takes less than 2 seconds.   Psychiatric: She has a normal mood and affect. Her speech is normal. Judgment and thought content normal. Cognition and memory are normal.   She appears a bit agitated         ED Course   Procedures  Labs Reviewed - No data to display       Imaging Results    None          Medications   buprenorphine-naloxone 8-2 SL tablet 8 mg (has no administration in time range)     Medical Decision Making  Opioid addiction  Buprenorphine p.o. and prescription  Call PCP this morning    Risk  Prescription drug management.                                      Clinical Impression:  Final diagnoses:  [F19.20] Drug addiction (Primary)          ED Disposition Condition    Discharge Stable          ED Prescriptions       Medication Sig Dispense Start Date End Date Auth. Provider    buprenorphine HCL (SUBUTEX) 8 mg Subl Place 1 tablet (8 mg total) under the tongue once  daily. 12 tablet 8/13/2024 -- Wild Stevens MD          Follow-up Information       Follow up With Specialties Details Why Contact Info    Riya Abbasi FNP-LUCA Family Medicine Call today  [address]  [facility] [id number] [phone]               Wild Stevens MD  08/13/24 6002

## 2024-08-14 ENCOUNTER — OFFICE VISIT (OUTPATIENT)
Dept: FAMILY MEDICINE | Facility: CLINIC | Age: 29
End: 2024-08-14
Payer: MEDICAID

## 2024-08-14 VITALS
WEIGHT: 114.38 LBS | BODY MASS INDEX: 20.27 KG/M2 | TEMPERATURE: 97 F | SYSTOLIC BLOOD PRESSURE: 124 MMHG | HEIGHT: 63 IN | HEART RATE: 87 BPM | OXYGEN SATURATION: 99 % | DIASTOLIC BLOOD PRESSURE: 82 MMHG

## 2024-08-14 DIAGNOSIS — F11.20 UNCOMPLICATED OPIOID DEPENDENCE: Primary | ICD-10-CM

## 2024-08-14 DIAGNOSIS — F19.20 DRUG ADDICTION: ICD-10-CM

## 2024-08-14 PROCEDURE — 99203 OFFICE O/P NEW LOW 30 MIN: CPT | Mod: ,,, | Performed by: FAMILY MEDICINE

## 2024-08-14 PROCEDURE — 3074F SYST BP LT 130 MM HG: CPT | Mod: CPTII,,, | Performed by: FAMILY MEDICINE

## 2024-08-14 PROCEDURE — 3079F DIAST BP 80-89 MM HG: CPT | Mod: CPTII,,, | Performed by: FAMILY MEDICINE

## 2024-08-14 PROCEDURE — 1159F MED LIST DOCD IN RCRD: CPT | Mod: CPTII,,, | Performed by: FAMILY MEDICINE

## 2024-08-14 PROCEDURE — 3008F BODY MASS INDEX DOCD: CPT | Mod: CPTII,,, | Performed by: FAMILY MEDICINE

## 2024-08-14 RX ORDER — BUPRENORPHINE HYDROCHLORIDE 8 MG/1
TABLET SUBLINGUAL
Qty: 75 TABLET | Refills: 0 | Status: SHIPPED | OUTPATIENT
Start: 2024-08-14

## 2024-08-14 RX ORDER — MEGESTROL ACETATE 125 MG/ML
SUSPENSION ORAL
COMMUNITY
Start: 2024-08-05

## 2024-08-14 NOTE — PROGRESS NOTES
SUBJECTIVE:  Christen Hawthorne is a 28 y.o. female here for new suboxone patient      HPI  This is a new patient visit.  She would like to establish care for buprenorphine therapy.  She was sent here by 1 of our local navigators.  She has been on buprenorphine for many years.  She started taking narcotics when she was a teenager prescribed by her pediatrician for fibromyalgia and has been on them for the last 10-15 years.  She has never used any street opioids, she denies fentanyl or heroin or any overdoses.  She was to go to a clinic in [place] but has not been able to go back to that clinic so was just getting some buprenorphine from friends.  She has no other medical problems.  Christen's allergies, medications, history, and problem list were updated as appropriate.    Review of Systems   Constitutional:  Negative for activity change, appetite change, fatigue and fever.   HENT:  Negative for congestion, ear pain, hearing loss, sore throat and trouble swallowing.    Eyes:  Negative for photophobia, pain, redness and visual disturbance.   Respiratory:  Negative for cough, chest tightness, shortness of breath and wheezing.    Cardiovascular:  Negative for chest pain, palpitations and leg swelling.   Gastrointestinal:  Negative for abdominal distention, abdominal pain and blood in stool.   Endocrine: Negative for cold intolerance, heat intolerance, polydipsia and polyuria.   Genitourinary:  Negative for difficulty urinating, dysuria and frequency.   Musculoskeletal:  Negative for arthralgias, gait problem, joint swelling and myalgias.   Skin:  Negative for color change, pallor and rash.   Allergic/Immunologic: Negative.    Neurological:  Negative for dizziness, seizures, speech difficulty, weakness and headaches.   Hematological:  Negative for adenopathy. Does not bruise/bleed easily.   Psychiatric/Behavioral:  Negative for agitation and confusion.       A comprehensive review of symptoms was completed and  "negative except as noted above.    No results found for this or any previous visit (from the past 504 hour(s)).    OBJECTIVE:  Vital signs  Vitals:    08/14/24 0952   BP: 124/82   BP Location: Left arm   Patient Position: Sitting   Pulse: 87   Temp: 96.5 °F (35.8 °C)   TempSrc: Temporal   SpO2: 99%   Weight: 51.9 kg (114 lb 6.4 oz)   Height: 5' 3.07" (1.602 m)        Physical Exam  Constitutional:       Appearance: Normal appearance.   HENT:      Head: Normocephalic and atraumatic.      Right Ear: External ear normal.      Left Ear: External ear normal.      Nose: Nose normal.      Mouth/Throat:      Mouth: Mucous membranes are moist.      Pharynx: Oropharynx is clear.   Eyes:      Extraocular Movements: Extraocular movements intact.      Conjunctiva/sclera: Conjunctivae normal.      Pupils: Pupils are equal, round, and reactive to light.   Cardiovascular:      Rate and Rhythm: Normal rate and regular rhythm.      Heart sounds: Normal heart sounds. No murmur heard.  Pulmonary:      Effort: Pulmonary effort is normal.      Breath sounds: Normal breath sounds. No wheezing or rhonchi.   Abdominal:      General: Abdomen is flat.      Palpations: Abdomen is soft.   Musculoskeletal:         General: Normal range of motion.      Cervical back: Normal range of motion and neck supple.   Skin:     General: Skin is warm and dry.   Neurological:      General: No focal deficit present.      Mental Status: She is alert and oriented to person, place, and time.   Psychiatric:         Mood and Affect: Mood normal.         Behavior: Behavior normal.         Thought Content: Thought content normal.         Judgment: Judgment normal.          ASSESSMENT/PLAN:  Opioid dependence uncomplicated  She has been taking 20 mg of Subutex daily.  We will continue this for now she has been doing well.   report was reviewed    She requested to get back on her Vyvanse for ADD.  I said lets see how she does for the next month with the " buprenorphine therapy 1st.  -     buprenorphine HCL (SUBUTEX) 8 mg Subl; Take 2.5 tablets daily  Dispense: 75 tablet; Refill: 0         Follow Up:  Follow up in about 1 month (around 9/14/2024).

## 2024-09-20 ENCOUNTER — OFFICE VISIT (OUTPATIENT)
Dept: FAMILY MEDICINE | Facility: CLINIC | Age: 29
End: 2024-09-20
Payer: MEDICAID

## 2024-09-20 VITALS
WEIGHT: 112.19 LBS | SYSTOLIC BLOOD PRESSURE: 122 MMHG | HEART RATE: 87 BPM | TEMPERATURE: 98 F | HEIGHT: 63 IN | DIASTOLIC BLOOD PRESSURE: 88 MMHG | OXYGEN SATURATION: 99 % | BODY MASS INDEX: 19.88 KG/M2

## 2024-09-20 DIAGNOSIS — F19.20 DRUG ADDICTION: ICD-10-CM

## 2024-09-20 DIAGNOSIS — F98.8 ATTENTION DEFICIT DISORDER (ADD) WITHOUT HYPERACTIVITY: ICD-10-CM

## 2024-09-20 DIAGNOSIS — F11.20 UNCOMPLICATED OPIOID DEPENDENCE: Primary | ICD-10-CM

## 2024-09-20 DIAGNOSIS — F17.200 TOBACCO DEPENDENCE: ICD-10-CM

## 2024-09-20 RX ORDER — BUPRENORPHINE HYDROCHLORIDE 8 MG/1
TABLET SUBLINGUAL
Qty: 75 TABLET | Refills: 2 | Status: SHIPPED | OUTPATIENT
Start: 2024-09-20

## 2024-09-20 RX ORDER — ALBUTEROL SULFATE 90 UG/1
2 AEROSOL, METERED RESPIRATORY (INHALATION) EVERY 4 HOURS PRN
Qty: 18 G | Refills: 2 | Status: SHIPPED | OUTPATIENT
Start: 2024-09-20

## 2024-09-20 RX ORDER — IBUPROFEN 800 MG/1
800 TABLET ORAL EVERY 6 HOURS PRN
COMMUNITY
Start: 2024-08-23

## 2024-09-20 RX ORDER — LISDEXAMFETAMINE DIMESYLATE 50 MG/1
50 CAPSULE ORAL EVERY MORNING
Qty: 30 CAPSULE | Refills: 0 | Status: SHIPPED | OUTPATIENT
Start: 2024-09-20 | End: 2024-10-20

## 2024-09-20 NOTE — PROGRESS NOTES
"SUBJECTIVE:  Christen Hawthorne is a 28 y.o. female here for Medication Management      HPI  Patient here for follow-up on chronic conditions.  She is doing well with Subutex 20 mg daily.  She would like to get back on Vyvanse that she has taken in the past as she has a lot of issues with poor concentration and getting things done.  She took stimulants throughout her childhood in school  Christen's allergies, medications, history, and problem list were updated as appropriate.    Review of Systems   See HPI  No results found for this or any previous visit (from the past 504 hour(s)).    OBJECTIVE:  Vital signs  Vitals:    09/20/24 0909   BP: 122/88   BP Location: Right arm   Patient Position: Sitting   BP Method: Medium (Automatic)   Pulse: 87   Temp: 97.9 °F (36.6 °C)   TempSrc: Temporal   SpO2: 99%   Weight: 50.9 kg (112 lb 3.2 oz)   Height: 5' 3.07" (1.602 m)        Physical Exam normal affect and speech    ASSESSMENT/PLAN:  1. Uncomplicated opioid dependence   report reviewed.  Doing on 20 mg of Subutex daily which will be continued      3. Attention deficit disorder (ADD) without hyperactivity  Start Vyvanse back at 50 mg daily    4. Tobacco dependence  Tobacco cessation counseling for 5 minutes    Other orders  -     VENTOLIN HFA 90 mcg/actuation inhaler; Inhale 2 puffs into the lungs every 4 (four) hours as needed for Wheezing.  Dispense: 18 g; Refill: 2  -     lisdexamfetamine (VYVANSE) 50 MG capsule; Take 1 capsule (50 mg total) by mouth every morning.  Dispense: 30 capsule; Refill: 0         Follow Up:  Follow up in about 3 months (around 12/20/2024).            "

## 2024-10-21 ENCOUNTER — HOSPITAL ENCOUNTER (EMERGENCY)
Facility: HOSPITAL | Age: 29
Discharge: HOME | End: 2024-10-21
Payer: MEDICAID

## 2024-10-21 VITALS
WEIGHT: 112 LBS | BODY MASS INDEX: 19.84 KG/M2 | OXYGEN SATURATION: 100 % | HEART RATE: 91 BPM | DIASTOLIC BLOOD PRESSURE: 87 MMHG | HEIGHT: 63 IN | SYSTOLIC BLOOD PRESSURE: 131 MMHG | RESPIRATION RATE: 20 BRPM | TEMPERATURE: 98 F

## 2024-10-21 DIAGNOSIS — Z51.89 VISIT FOR WOUND CHECK: Primary | ICD-10-CM

## 2024-10-21 DIAGNOSIS — F90.9 ATTENTION-DEFICIT HYPERACTIVITY DISORDER, UNSPECIFIED TYPE: ICD-10-CM

## 2024-10-21 DIAGNOSIS — F98.8 ATTENTION DEFICIT DISORDER (ADD) WITHOUT HYPERACTIVITY: Primary | ICD-10-CM

## 2024-10-21 DIAGNOSIS — F90.9 ATTENTION DEFICIT HYPERACTIVITY DISORDER (ADHD), UNSPECIFIED ADHD TYPE: ICD-10-CM

## 2024-10-21 PROCEDURE — 99282 EMERGENCY DEPT VISIT SF MDM: CPT

## 2024-10-21 RX ORDER — LISDEXAMFETAMINE DIMESYLATE 50 MG/1
50 CAPSULE ORAL EVERY MORNING
Qty: 30 CAPSULE | Refills: 0 | Status: SHIPPED | OUTPATIENT
Start: 2024-10-21 | End: 2024-11-20

## 2024-10-21 RX ORDER — MUPIROCIN 20 MG/G
OINTMENT TOPICAL 3 TIMES DAILY
Qty: 22 G | Refills: 0 | Status: SHIPPED | OUTPATIENT
Start: 2024-10-21 | End: 2024-10-26

## 2024-10-21 RX ORDER — LISDEXAMFETAMINE DIMESYLATE 50 MG/1
50 CAPSULE ORAL EVERY MORNING
Qty: 30 CAPSULE | Refills: 0 | Status: SHIPPED | OUTPATIENT
Start: 2024-10-21 | End: 2024-10-21 | Stop reason: SDUPTHER

## 2024-10-21 RX ORDER — LISDEXAMFETAMINE DIMESYLATE 50 MG/1
50 CAPSULE ORAL EVERY MORNING
Qty: 30 CAPSULE | Refills: 0 | OUTPATIENT
Start: 2024-10-21

## 2024-10-28 ENCOUNTER — HOSPITAL ENCOUNTER (EMERGENCY)
Facility: HOSPITAL | Age: 29
Discharge: PSYCHIATRIC OR SUBSTANCE USE FACILITY | End: 2024-10-28
Attending: INTERNAL MEDICINE
Payer: MEDICAID

## 2024-10-28 ENCOUNTER — HOSPITAL ENCOUNTER (INPATIENT)
Facility: HOSPITAL | Age: 29
LOS: 7 days | Discharge: HOME | DRG: 885 | End: 2024-11-04
Attending: PSYCHIATRY & NEUROLOGY | Admitting: PSYCHIATRY & NEUROLOGY
Payer: MEDICAID

## 2024-10-28 VITALS
TEMPERATURE: 98 F | HEART RATE: 77 BPM | RESPIRATION RATE: 18 BRPM | WEIGHT: 130 LBS | DIASTOLIC BLOOD PRESSURE: 70 MMHG | HEIGHT: 63 IN | OXYGEN SATURATION: 100 % | BODY MASS INDEX: 23.04 KG/M2 | SYSTOLIC BLOOD PRESSURE: 105 MMHG

## 2024-10-28 DIAGNOSIS — Z00.8 MEDICAL CLEARANCE FOR PSYCHIATRIC ADMISSION: ICD-10-CM

## 2024-10-28 DIAGNOSIS — F29 PSYCHOSIS, UNSPECIFIED PSYCHOSIS TYPE: ICD-10-CM

## 2024-10-28 DIAGNOSIS — R45.851 SUICIDAL IDEATIONS: Primary | ICD-10-CM

## 2024-10-28 DIAGNOSIS — N39.0 URINARY TRACT INFECTION WITHOUT HEMATURIA, SITE UNSPECIFIED: ICD-10-CM

## 2024-10-28 DIAGNOSIS — F19.90 SUBSTANCE USE DISORDER: ICD-10-CM

## 2024-10-28 DIAGNOSIS — F32.A DEPRESSION: ICD-10-CM

## 2024-10-28 LAB
ALBUMIN SERPL-MCNC: 4.1 G/DL (ref 3.5–5)
ALBUMIN/GLOB SERPL: 1.2 RATIO (ref 1.1–2)
ALP SERPL-CCNC: 94 UNIT/L (ref 40–150)
ALT SERPL-CCNC: 16 UNIT/L (ref 0–55)
AMPHET UR QL SCN: POSITIVE
ANION GAP SERPL CALC-SCNC: 8 MEQ/L
APAP SERPL-MCNC: <3 UG/ML (ref 10–30)
AST SERPL-CCNC: 21 UNIT/L (ref 5–34)
B-HCG UR QL: NEGATIVE
BACTERIA #/AREA URNS AUTO: ABNORMAL /HPF
BARBITURATE SCN PRESENT UR: NEGATIVE
BASOPHILS # BLD AUTO: 0.03 X10(3)/MCL
BASOPHILS NFR BLD AUTO: 0.4 %
BENZODIAZ UR QL SCN: NEGATIVE
BILIRUB SERPL-MCNC: 0.7 MG/DL
BILIRUB UR QL STRIP.AUTO: NEGATIVE
BUN SERPL-MCNC: 8 MG/DL (ref 7–18.7)
CALCIUM SERPL-MCNC: 9.8 MG/DL (ref 8.4–10.2)
CANNABINOIDS UR QL SCN: NEGATIVE
CHLORIDE SERPL-SCNC: 110 MMOL/L (ref 98–107)
CLARITY UR: ABNORMAL
CO2 SERPL-SCNC: 26 MMOL/L (ref 22–29)
COCAINE UR QL SCN: NEGATIVE
COLOR UR AUTO: YELLOW
CREAT SERPL-MCNC: 0.82 MG/DL (ref 0.55–1.02)
CREAT/UREA NIT SERPL: 10
EOSINOPHIL # BLD AUTO: 0.3 X10(3)/MCL (ref 0–0.9)
EOSINOPHIL NFR BLD AUTO: 3.8 %
ERYTHROCYTE [DISTWIDTH] IN BLOOD BY AUTOMATED COUNT: 11.6 % (ref 11.5–17)
ETHANOL SERPL-MCNC: <10 MG/DL
FENTANYL UR QL SCN: POSITIVE
GFR SERPLBLD CREATININE-BSD FMLA CKD-EPI: >60 ML/MIN/1.73/M2
GLOBULIN SER-MCNC: 3.5 GM/DL (ref 2.4–3.5)
GLUCOSE SERPL-MCNC: 109 MG/DL (ref 74–100)
GLUCOSE UR QL STRIP: NEGATIVE
HCT VFR BLD AUTO: 41.7 % (ref 37–47)
HGB BLD-MCNC: 14 G/DL (ref 12–16)
HGB UR QL STRIP: ABNORMAL
IMM GRANULOCYTES # BLD AUTO: 0.01 X10(3)/MCL (ref 0–0.04)
IMM GRANULOCYTES NFR BLD AUTO: 0.1 %
KETONES UR QL STRIP: ABNORMAL
LEUKOCYTE ESTERASE UR QL STRIP: ABNORMAL
LYMPHOCYTES # BLD AUTO: 2.48 X10(3)/MCL (ref 0.6–4.6)
LYMPHOCYTES NFR BLD AUTO: 31.7 %
MCH RBC QN AUTO: 31.3 PG (ref 27–31)
MCHC RBC AUTO-ENTMCNC: 33.6 G/DL (ref 33–36)
MCV RBC AUTO: 93.3 FL (ref 80–94)
MDMA UR QL SCN: NEGATIVE
MONOCYTES # BLD AUTO: 0.81 X10(3)/MCL (ref 0.1–1.3)
MONOCYTES NFR BLD AUTO: 10.3 %
MUCOUS THREADS URNS QL MICRO: ABNORMAL /LPF
NEUTROPHILS # BLD AUTO: 4.2 X10(3)/MCL (ref 2.1–9.2)
NEUTROPHILS NFR BLD AUTO: 53.7 %
NITRITE UR QL STRIP: POSITIVE
OHS QRS DURATION: 74 MS
OHS QTC CALCULATION: 432 MS
OPIATES UR QL SCN: NEGATIVE
PCP UR QL: NEGATIVE
PH UR STRIP: 6 [PH]
PH UR: 6 [PH] (ref 3–11)
PLATELET # BLD AUTO: 335 X10(3)/MCL (ref 130–400)
PMV BLD AUTO: 9 FL (ref 7.4–10.4)
POTASSIUM SERPL-SCNC: 4.6 MMOL/L (ref 3.5–5.1)
PROT SERPL-MCNC: 7.6 GM/DL (ref 6.4–8.3)
PROT UR QL STRIP: NEGATIVE
RBC # BLD AUTO: 4.47 X10(6)/MCL (ref 4.2–5.4)
RBC #/AREA URNS AUTO: ABNORMAL /HPF
SODIUM SERPL-SCNC: 144 MMOL/L (ref 136–145)
SP GR UR STRIP.AUTO: 1.02 (ref 1–1.03)
SPECIFIC GRAVITY, URINE AUTO (.000) (OHS): 1.02 (ref 1–1.03)
SQUAMOUS #/AREA URNS AUTO: ABNORMAL /HPF
TSH SERPL-ACNC: 1.71 UIU/ML (ref 0.35–4.94)
UROBILINOGEN UR STRIP-ACNC: 1
WBC # BLD AUTO: 7.83 X10(3)/MCL (ref 4.5–11.5)
WBC #/AREA URNS AUTO: ABNORMAL /HPF

## 2024-10-28 PROCEDURE — 96372 THER/PROPH/DIAG INJ SC/IM: CPT | Performed by: INTERNAL MEDICINE

## 2024-10-28 PROCEDURE — 87186 SC STD MICRODIL/AGAR DIL: CPT | Performed by: INTERNAL MEDICINE

## 2024-10-28 PROCEDURE — 93010 ELECTROCARDIOGRAM REPORT: CPT | Mod: ,,, | Performed by: STUDENT IN AN ORGANIZED HEALTH CARE EDUCATION/TRAINING PROGRAM

## 2024-10-28 PROCEDURE — 93005 ELECTROCARDIOGRAM TRACING: CPT

## 2024-10-28 PROCEDURE — 12400001 HC PSYCH SEMI-PRIVATE ROOM

## 2024-10-28 PROCEDURE — 63600175 PHARM REV CODE 636 W HCPCS: Performed by: INTERNAL MEDICINE

## 2024-10-28 PROCEDURE — 82077 ASSAY SPEC XCP UR&BREATH IA: CPT | Performed by: INTERNAL MEDICINE

## 2024-10-28 PROCEDURE — 87077 CULTURE AEROBIC IDENTIFY: CPT | Performed by: INTERNAL MEDICINE

## 2024-10-28 PROCEDURE — 80307 DRUG TEST PRSMV CHEM ANLYZR: CPT | Performed by: INTERNAL MEDICINE

## 2024-10-28 PROCEDURE — 25000003 PHARM REV CODE 250: Performed by: INTERNAL MEDICINE

## 2024-10-28 PROCEDURE — 80053 COMPREHEN METABOLIC PANEL: CPT | Performed by: INTERNAL MEDICINE

## 2024-10-28 PROCEDURE — 80143 DRUG ASSAY ACETAMINOPHEN: CPT | Performed by: INTERNAL MEDICINE

## 2024-10-28 PROCEDURE — 81001 URINALYSIS AUTO W/SCOPE: CPT | Performed by: INTERNAL MEDICINE

## 2024-10-28 PROCEDURE — 84443 ASSAY THYROID STIM HORMONE: CPT | Performed by: INTERNAL MEDICINE

## 2024-10-28 PROCEDURE — 99285 EMERGENCY DEPT VISIT HI MDM: CPT | Mod: 25

## 2024-10-28 PROCEDURE — 85025 COMPLETE CBC W/AUTO DIFF WBC: CPT | Performed by: INTERNAL MEDICINE

## 2024-10-28 PROCEDURE — 81025 URINE PREGNANCY TEST: CPT | Performed by: INTERNAL MEDICINE

## 2024-10-28 RX ORDER — LORAZEPAM 2 MG/ML
2 INJECTION INTRAMUSCULAR
Status: COMPLETED | OUTPATIENT
Start: 2024-10-28 | End: 2024-10-28

## 2024-10-28 RX ORDER — DIPHENHYDRAMINE HYDROCHLORIDE 50 MG/ML
50 INJECTION INTRAMUSCULAR; INTRAVENOUS EVERY 4 HOURS PRN
Status: DISCONTINUED | OUTPATIENT
Start: 2024-10-28 | End: 2024-11-04 | Stop reason: HOSPADM

## 2024-10-28 RX ORDER — ALUMINUM HYDROXIDE, MAGNESIUM HYDROXIDE, AND SIMETHICONE 1200; 120; 1200 MG/30ML; MG/30ML; MG/30ML
30 SUSPENSION ORAL EVERY 6 HOURS PRN
Status: DISCONTINUED | OUTPATIENT
Start: 2024-10-28 | End: 2024-11-04 | Stop reason: HOSPADM

## 2024-10-28 RX ORDER — CEPHALEXIN 250 MG/1
250 CAPSULE ORAL EVERY 8 HOURS
Qty: 28 CAPSULE | Refills: 0 | Status: ON HOLD | OUTPATIENT
Start: 2024-10-28 | End: 2024-11-01

## 2024-10-28 RX ORDER — ONDANSETRON 4 MG/1
4 TABLET, ORALLY DISINTEGRATING ORAL EVERY 6 HOURS PRN
Status: DISCONTINUED | OUTPATIENT
Start: 2024-10-28 | End: 2024-11-04 | Stop reason: HOSPADM

## 2024-10-28 RX ORDER — IBUPROFEN 200 MG
1 TABLET ORAL DAILY
Status: DISCONTINUED | OUTPATIENT
Start: 2024-10-29 | End: 2024-11-04 | Stop reason: HOSPADM

## 2024-10-28 RX ORDER — LORAZEPAM 2 MG/ML
2 INJECTION INTRAMUSCULAR EVERY 4 HOURS PRN
Status: DISCONTINUED | OUTPATIENT
Start: 2024-10-28 | End: 2024-11-04 | Stop reason: HOSPADM

## 2024-10-28 RX ORDER — DIPHENHYDRAMINE HCL 50 MG
50 CAPSULE ORAL EVERY 4 HOURS PRN
Status: DISCONTINUED | OUTPATIENT
Start: 2024-10-28 | End: 2024-11-04 | Stop reason: HOSPADM

## 2024-10-28 RX ORDER — HYDROXYZINE HYDROCHLORIDE 50 MG/1
50 TABLET, FILM COATED ORAL EVERY 4 HOURS PRN
Status: DISCONTINUED | OUTPATIENT
Start: 2024-10-28 | End: 2024-11-04 | Stop reason: HOSPADM

## 2024-10-28 RX ORDER — CEPHALEXIN 250 MG/1
500 CAPSULE ORAL EVERY 8 HOURS
Status: DISCONTINUED | OUTPATIENT
Start: 2024-10-28 | End: 2024-10-28 | Stop reason: HOSPADM

## 2024-10-28 RX ORDER — LORAZEPAM 1 MG/1
2 TABLET ORAL EVERY 4 HOURS PRN
Status: DISCONTINUED | OUTPATIENT
Start: 2024-10-28 | End: 2024-11-04 | Stop reason: HOSPADM

## 2024-10-28 RX ORDER — ADHESIVE BANDAGE
30 BANDAGE TOPICAL DAILY PRN
Status: DISCONTINUED | OUTPATIENT
Start: 2024-10-28 | End: 2024-11-04 | Stop reason: HOSPADM

## 2024-10-28 RX ORDER — HALOPERIDOL 5 MG/ML
10 INJECTION INTRAMUSCULAR EVERY 4 HOURS PRN
Status: DISCONTINUED | OUTPATIENT
Start: 2024-10-28 | End: 2024-11-04 | Stop reason: HOSPADM

## 2024-10-28 RX ORDER — HALOPERIDOL 5 MG/1
10 TABLET ORAL EVERY 4 HOURS PRN
Status: DISCONTINUED | OUTPATIENT
Start: 2024-10-28 | End: 2024-11-04 | Stop reason: HOSPADM

## 2024-10-28 RX ORDER — ACETAMINOPHEN 325 MG/1
650 TABLET ORAL EVERY 6 HOURS PRN
Status: DISCONTINUED | OUTPATIENT
Start: 2024-10-28 | End: 2024-11-04 | Stop reason: HOSPADM

## 2024-10-28 RX ORDER — TRAZODONE HYDROCHLORIDE 100 MG/1
100 TABLET ORAL NIGHTLY PRN
Status: DISCONTINUED | OUTPATIENT
Start: 2024-10-28 | End: 2024-11-04 | Stop reason: HOSPADM

## 2024-10-28 RX ADMIN — CEPHALEXIN 500 MG: 250 CAPSULE ORAL at 03:10

## 2024-10-28 RX ADMIN — LORAZEPAM 2 MG: 2 INJECTION INTRAMUSCULAR; INTRAVENOUS at 12:10

## 2024-10-29 LAB
ALBUMIN SERPL-MCNC: 3.8 G/DL (ref 3.5–5)
ALBUMIN/GLOB SERPL: 1.4 RATIO (ref 1.1–2)
ALP SERPL-CCNC: 83 UNIT/L (ref 40–150)
ALT SERPL-CCNC: 11 UNIT/L (ref 0–55)
ANION GAP SERPL CALC-SCNC: 7 MEQ/L
AST SERPL-CCNC: 16 UNIT/L (ref 5–34)
BASOPHILS # BLD AUTO: 0.03 X10(3)/MCL
BASOPHILS NFR BLD AUTO: 0.5 %
BILIRUB SERPL-MCNC: 0.5 MG/DL
BUN SERPL-MCNC: 11.6 MG/DL (ref 7–18.7)
CALCIUM SERPL-MCNC: 9.3 MG/DL (ref 8.4–10.2)
CHLORIDE SERPL-SCNC: 109 MMOL/L (ref 98–107)
CHOLEST SERPL-MCNC: 142 MG/DL
CHOLEST/HDLC SERPL: 4 {RATIO} (ref 0–5)
CO2 SERPL-SCNC: 23 MMOL/L (ref 22–29)
CREAT SERPL-MCNC: 0.73 MG/DL (ref 0.55–1.02)
CREAT/UREA NIT SERPL: 16
EOSINOPHIL # BLD AUTO: 0.32 X10(3)/MCL (ref 0–0.9)
EOSINOPHIL NFR BLD AUTO: 5.2 %
ERYTHROCYTE [DISTWIDTH] IN BLOOD BY AUTOMATED COUNT: 11.6 % (ref 11.5–17)
EST. AVERAGE GLUCOSE BLD GHB EST-MCNC: 96.8 MG/DL
GFR SERPLBLD CREATININE-BSD FMLA CKD-EPI: >60 ML/MIN/1.73/M2
GLOBULIN SER-MCNC: 2.8 GM/DL (ref 2.4–3.5)
GLUCOSE SERPL-MCNC: 86 MG/DL (ref 74–100)
HBA1C MFR BLD: 5 %
HCT VFR BLD AUTO: 40.5 % (ref 37–47)
HDLC SERPL-MCNC: 38 MG/DL (ref 35–60)
HGB BLD-MCNC: 13.5 G/DL (ref 12–16)
IMM GRANULOCYTES # BLD AUTO: 0.01 X10(3)/MCL (ref 0–0.04)
IMM GRANULOCYTES NFR BLD AUTO: 0.2 %
LDLC SERPL CALC-MCNC: 90 MG/DL (ref 50–140)
LYMPHOCYTES # BLD AUTO: 2.93 X10(3)/MCL (ref 0.6–4.6)
LYMPHOCYTES NFR BLD AUTO: 47.5 %
MCH RBC QN AUTO: 31.3 PG (ref 27–31)
MCHC RBC AUTO-ENTMCNC: 33.3 G/DL (ref 33–36)
MCV RBC AUTO: 93.8 FL (ref 80–94)
MONOCYTES # BLD AUTO: 0.68 X10(3)/MCL (ref 0.1–1.3)
MONOCYTES NFR BLD AUTO: 11 %
NEUTROPHILS # BLD AUTO: 2.2 X10(3)/MCL (ref 2.1–9.2)
NEUTROPHILS NFR BLD AUTO: 35.6 %
NRBC BLD AUTO-RTO: 0 %
PLATELET # BLD AUTO: 337 X10(3)/MCL (ref 130–400)
PMV BLD AUTO: 9.2 FL (ref 7.4–10.4)
POTASSIUM SERPL-SCNC: 4.3 MMOL/L (ref 3.5–5.1)
PROT SERPL-MCNC: 6.6 GM/DL (ref 6.4–8.3)
RBC # BLD AUTO: 4.32 X10(6)/MCL (ref 4.2–5.4)
SODIUM SERPL-SCNC: 139 MMOL/L (ref 136–145)
T PALLIDUM AB SER QL: NONREACTIVE
TRIGL SERPL-MCNC: 70 MG/DL (ref 37–140)
TSH SERPL-ACNC: 2.48 UIU/ML (ref 0.35–4.94)
VLDLC SERPL CALC-MCNC: 14 MG/DL
WBC # BLD AUTO: 6.17 X10(3)/MCL (ref 4.5–11.5)

## 2024-10-29 PROCEDURE — 83036 HEMOGLOBIN GLYCOSYLATED A1C: CPT | Performed by: PSYCHIATRY & NEUROLOGY

## 2024-10-29 PROCEDURE — 25000003 PHARM REV CODE 250: Performed by: PSYCHIATRY & NEUROLOGY

## 2024-10-29 PROCEDURE — 84443 ASSAY THYROID STIM HORMONE: CPT | Performed by: PSYCHIATRY & NEUROLOGY

## 2024-10-29 PROCEDURE — 25000003 PHARM REV CODE 250: Performed by: INTERNAL MEDICINE

## 2024-10-29 PROCEDURE — 80061 LIPID PANEL: CPT | Performed by: PSYCHIATRY & NEUROLOGY

## 2024-10-29 PROCEDURE — 86780 TREPONEMA PALLIDUM: CPT | Performed by: PSYCHIATRY & NEUROLOGY

## 2024-10-29 PROCEDURE — 12400001 HC PSYCH SEMI-PRIVATE ROOM

## 2024-10-29 PROCEDURE — 85025 COMPLETE CBC W/AUTO DIFF WBC: CPT | Performed by: PSYCHIATRY & NEUROLOGY

## 2024-10-29 PROCEDURE — 80053 COMPREHEN METABOLIC PANEL: CPT | Performed by: PSYCHIATRY & NEUROLOGY

## 2024-10-29 PROCEDURE — S4991 NICOTINE PATCH NONLEGEND: HCPCS | Performed by: PSYCHIATRY & NEUROLOGY

## 2024-10-29 RX ORDER — MUPIROCIN 20 MG/G
OINTMENT TOPICAL 2 TIMES DAILY
Status: DISCONTINUED | OUTPATIENT
Start: 2024-10-29 | End: 2024-10-29

## 2024-10-29 RX ORDER — BUPRENORPHINE HYDROCHLORIDE 8 MG/1
8 TABLET SUBLINGUAL 2 TIMES DAILY
Status: DISCONTINUED | OUTPATIENT
Start: 2024-10-29 | End: 2024-11-04 | Stop reason: HOSPADM

## 2024-10-29 RX ORDER — CEPHALEXIN 250 MG/1
250 CAPSULE ORAL 3 TIMES DAILY
Status: DISCONTINUED | OUTPATIENT
Start: 2024-10-29 | End: 2024-11-04 | Stop reason: HOSPADM

## 2024-10-29 RX ADMIN — BUPRENORPHINE 8 MG: 8 TABLET SUBLINGUAL at 09:10

## 2024-10-29 RX ADMIN — BUPRENORPHINE 8 MG: 8 TABLET SUBLINGUAL at 08:10

## 2024-10-29 RX ADMIN — NICOTINE 1 PATCH: 21 PATCH, EXTENDED RELEASE TRANSDERMAL at 08:10

## 2024-10-29 RX ADMIN — CEPHALEXIN 250 MG: 250 CAPSULE ORAL at 08:10

## 2024-10-29 RX ADMIN — CEPHALEXIN 250 MG: 250 CAPSULE ORAL at 02:10

## 2024-10-29 NOTE — NURSING
AAOx4. Unkempt appearance, unclean and malodorous. Speech with soft low rate and tone. Steady gait. Flat affect, anxious and depressed mood. denies suicidal ideations. Denies homicidal ideation. . Fair eye contact.  Compliant with medications. Constricted and lethargic.  Falling asleep between questions. care and provide a safe and therapeutic environment.  Continue with plan of care and q 15 minute safety checks.

## 2024-10-29 NOTE — PROGRESS NOTES
10/29/24 1400   [facility] Group Therapy   Group Name Therapeutic Recreation   Specific Interventions Skilled Activity Leisure Education and Awareness   Participation Level None   Participation Quality Sleeping  (excused 2* PRM medication)

## 2024-10-29 NOTE — PROGRESS NOTES
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 CTRS attempted TR evaluation with Pt unable to tolerate assessment interview due to PRN medication. CTRS utilized EMR and observation to complete TR assessment. EMR reflects Christen's admission to [facility] for Meth use. Christen appears to be able to perform her ADL's. CTRS will educate Pt to TR group times and dates along with prompting Pt prior to TR groups. EMR reflects Christen's treatment goal as patient expressed some interest in rehabilitation for meth use; however, she stated a preference to go home first to check on her children before entering rehab.     10/29/24 1126   General   Admit Date 10/28/24   Primary Diagnosis Mood Disorder NOS (F39)   Secondary Diagnosis Amphetamine Abuse   Cheondoism   (unable to tolerate interview due to PRM medication)   Number of Children 2   Children Living? 2   Occupation unemployed   Does the patient have dentures?   (unable to tolerate interview due to PRM medication)   If you were to take part in activities, which of the following would you prefer?   (unable to tolerate interview due to PRM medication)   Do you feel like you have enough to keep you busy now?   (unable to tolerate interview due to PRM medication)   Do you believe that you have the opportunity for physical activity?   (unable to tolerate interview due to PRM medication)   Activity Capabilities Moderate   Subjective   Patient states EMR reflects Chief Complaint: Meth use   Assessment   Mobility ambulates independently   Transfers independently   Musculoskeletal   (unable to tolerate interview due to PRM medication)   Visual Acuity normal vision   Visual Perception depth perception;color perception;recognizes letters;recognizes numbers   Hearing normal   Speech/Communication normal   Cognitive Concerns oriented x4   Emotional Concerns   (appers Dysphoric)   Leisure Interest Survey   Leisure Interest  Survey   (unable to tolerate interview due to PRM medication)   Goals   Additional Documentation yes   Goal Formulation Patient unable to participate in goal setting  (unable to tolerate interview due to PRM medication)   Time For Goal Achievement 7 days   Goal 1 EMR reflects patient expressed some interest in rehabilitation for meth use; however, she stated a preference to go home first to check on her children before entering rehab   Goal 1-Progress ongoing-   Plan   Planned Therapy Intervention Group Recreational Therapy   Expected Length of Stay 5-7days   PT Frequency Minimum of 3 visits per week

## 2024-10-29 NOTE — PROGRESS NOTES
10/29/24 1000   [facility] Group Therapy   Group Name Therapeutic Recreation   Specific Interventions Skilled Activity Creative Expression   Participation Level None   Participation Quality Sleeping  (excused 2* PRM medication)

## 2024-10-29 NOTE — PLAN OF CARE
Problem: Adult Behavioral Health Plan of Care  Goal: Plan of Care Review  Outcome: Not Progressing  Goal: Patient-Specific Goal (Individualization)  Outcome: Not Progressing  Goal: Adheres to Safety Considerations for Self and Others  Outcome: Not Progressing  Goal: Absence of New-Onset Illness or Injury  Outcome: Not Progressing  Goal: Optimized Coping Skills in Response to Life Stressors  Outcome: Not Progressing  Goal: Develops/Participates in Therapeutic [place] to Support Successful Transition  Outcome: Not Progressing  Goal: Rounds/Family Conference  Outcome: Not Progressing     Problem: Violence Risk or Actual  Goal: Anger and Impulse Control  Outcome: Not Progressing     Problem: Depressive Signs/Symptoms  Goal: Optimized Energy Level (Depressive Signs/Symptoms)  Outcome: Not Progressing  Goal: Optimized Cognitive Function (Depressive Signs/Symptoms)  Outcome: Not Progressing  Goal: Increased Participation and Engagement (Depressive Signs/Symptoms)  Outcome: Not Progressing  Goal: Enhanced Self-Esteem and Confidence (Depressive Signs/Symptoms)  Outcome: Not Progressing  Goal: Improved Mood Symptoms (Depressive Signs/Symptoms)  Outcome: Not Progressing  Goal: Optimized Nutrition Intake (Depressive Signs/Symptoms)  Outcome: Not Progressing  Goal: Improved Psychomotor Symptoms (Depressive Signs/Symptoms)  Outcome: Not Progressing  Goal: Improved Sleep (Depressive Signs/Symptoms)  Outcome: Not Progressing  Goal: Enhanced Social, Occupational or Functional Skills (Depressive Signs/Symptoms)  Outcome: Not Progressing     Problem: Psychotic Signs/Symptoms  Goal: Improved Behavioral Control (Psychotic Signs/Symptoms)  Outcome: Not Progressing  Goal: Optimal Cognitive Function (Psychotic Signs/Symptoms)  Outcome: Not Progressing  Goal: Increased Participation and Engagement (Psychotic Signs/Symptoms)  Outcome: Not Progressing  Goal: Improved Mood Symptoms (Psychotic Signs/Symptoms)  Outcome: Not Progressing  Goal:  Improved Psychomotor Symptoms (Psychotic Signs/Symptoms)  Outcome: Not Progressing  Goal: Decreased Sensory Symptoms (Psychotic Signs/Symptoms)  Outcome: Not Progressing  Goal: Improved Sleep (Psychotic Signs/Symptoms)  Outcome: Not Progressing  Goal: Enhanced Social, Occupational or Functional Skills (Psychotic Signs/Symptoms)  Outcome: Not Progressing     Problem: Psychotic Signs/Symptoms  Goal: Increased Participation and Engagement (Psychotic Signs/Symptoms)  Outcome: Not Progressing

## 2024-10-29 NOTE — H&P
10/29/2024  Christen Hawthorne   1995   [id number]            Psychiatry Inpatient Admission Note    Date of Admission: 10/28/2024  6:44 PM    Current Legal Status: Physician's Emergency Certificate    Chief Complaint: Meth use    SUBJECTIVE:   History of Present Illness:   Christen Hawthorne is a 28-year-old female placed under a PEC at [facility] after presenting with suicidal ideation following methamphetamine use.     Today, she is calm and cooperative, though she expresses uncertainty about why she is here. She reports that she became upset after using meth and has been using meth regularly for several years. She is currently under the care of Dr. Rivas, who prescribes Subutex and Vyvanse, which she believes are intended to support her cessation from meth use. Despite this, she continues to use meth daily alongside Vyvanse and Subutex. The patient expressed some interest in rehabilitation for meth use; however, she stated a preference to go home first to check on her children before entering rehab. Given this hesitation, there is a high likelihood she may not follow through on her own, so I will continue to educate her on the importance of pursuing rehab directly to support her recovery.    The patient denies any active suicidal or homicidal ideation and reports no current symptoms of depression, anxiety, auditory or visual hallucinations, paranoia, or delusions. She denies a history of mental health conditions and has not taken psychotropic medications in the past. Additionally, she states she has never been treated by a psychiatric provider.    We will continue her Subutex to prevent withdrawal symptoms and coordinate with staff regarding potential placement in a rehabilitation program.     She has a history of Subutex use at a dose of 20 mg once daily.    The patient will be admitted for medication management and safety monitoring.        Past Psychiatric History:   Previous Psychiatric Hospitalizations: Denies  "  Previous Medication Trials: Denies   Previous Suicide Attempts: Denies    Outpatient psychiatrist: Denies     Current Medications:   Home Psychiatric Meds: Vyvanse    Past Medical/Surgical History:   Past Medical History:   Diagnosis Date    Anxiety disorder, unspecified     Depression     Fibromyalgia     History of psychiatric hospitalization     Hx of psychiatric care     Substance abuse      Past Surgical History:   Procedure Laterality Date    BILATERAL TUBAL LIGATION         Family Psychiatric History:   Anxiety runs in family     Allergies:   Review of patient's allergies indicates:   Allergen Reactions    Zithromax z-ara [azithromycin] Nausea And Vomiting       Substance Abuse History:   Tobacco: 1 PPD  Alcohol: Denies  Illicit Substances: Meth  Treatment: Mulitple        Scheduled Meds:    cephALEXin  250 mg Oral TID    nicotine  1 patch Transdermal Daily      PRN Meds:   Current Facility-Administered Medications:     acetaminophen, 650 mg, Oral, Q6H PRN    aluminum-magnesium hydroxide-simethicone, 30 mL, Oral, Q6H PRN    haloperidoL, 10 mg, Oral, Q4H PRN **AND** diphenhydrAMINE, 50 mg, Oral, Q4H PRN **AND** LORazepam, 2 mg, Oral, Q4H PRN **AND** haloperidol lactate, 10 mg, Intramuscular, Q4H PRN **AND** diphenhydrAMINE, 50 mg, Intramuscular, Q4H PRN **AND** lorazepam, 2 mg, Intramuscular, Q4H PRN    hydrOXYzine HCL, 50 mg, Oral, Q4H PRN    magnesium hydroxide 400 mg/5 ml, 30 mL, Oral, Daily PRN    ondansetron, 4 mg, Oral, Q6H PRN    traZODone, 100 mg, Oral, Nightly PRN   Psychotherapeutics (From admission, onward)      Start     Stop Route Frequency Ordered    10/28/24 1845  traZODone tablet 100 mg         -- Oral Nightly PRN 10/28/24 1845    10/28/24 1845  haloperidoL tablet 10 mg  (Med - Acute  Behavioral Management)        Placed in "And" Linked Group    -- Oral Every 4 hours PRN 10/28/24 1845    10/28/24 1845  LORazepam tablet 2 mg  (Med - Acute  Behavioral Management)        Placed in "And" Linked " "Group    -- Oral Every 4 hours PRN 10/28/24 1845    10/28/24 1845  haloperidol lactate injection 10 mg  (Med - Acute  Behavioral Management)        Placed in "And" Linked Group    -- IM Every 4 hours PRN 10/28/24 1845    10/28/24 1845  LORazepam injection 2 mg  (Med - Acute  Behavioral Management)        Placed in "And" Linked Group    -- IM Every 4 hours PRN 10/28/24 1845              Social History:  Housing Status: Homeless  Relationship Status/Sexual Orientation: Single   Children: Two  Education: High school   Employment Status/Info: Unemployed    history: Denies  History of physical/sexual abuse: Denies   Access to gun: Denies       Legal History:   Past Charges/Incarcerations: Multiple times   Pending charges: Denies      OBJECTIVE:   Medical Review Of Systems:  A comprehensive review of systems was negative.    Vitals   Vitals:    10/28/24 1901   BP: 124/86   Pulse: 98   Resp: 18   Temp: 98.6 °F (37 °C)        Labs/Imaging/Studies:   Recent Results (from the past 48 hours)   Comprehensive metabolic panel    Collection Time: 10/28/24 12:31 PM   Result Value Ref Range    Sodium 144 136 - 145 mmol/L    Potassium 4.6 3.5 - 5.1 mmol/L    Chloride 110 (H) 98 - 107 mmol/L    CO2 26 22 - 29 mmol/L    Glucose 109 (H) 74 - 100 mg/dL    Blood Urea Nitrogen 8.0 7.0 - 18.7 mg/dL    Creatinine 0.82 0.55 - 1.02 mg/dL    Calcium 9.8 8.4 - 10.2 mg/dL    Protein Total 7.6 6.4 - 8.3 gm/dL    Albumin 4.1 3.5 - 5.0 g/dL    Globulin 3.5 2.4 - 3.5 gm/dL    Albumin/Globulin Ratio 1.2 1.1 - 2.0 ratio    Bilirubin Total 0.7 <=1.5 mg/dL    ALP 94 40 - 150 unit/L    ALT 16 0 - 55 unit/L    AST 21 5 - 34 unit/L    eGFR >60 mL/min/1.73/m2    Anion Gap 8.0 mEq/L    BUN/Creatinine Ratio 10    TSH    Collection Time: 10/28/24 12:31 PM   Result Value Ref Range    TSH 1.708 0.350 - 4.940 uIU/mL   Ethanol    Collection Time: 10/28/24 12:31 PM   Result Value Ref Range    Ethanol Level <10.0 <=10.0 mg/dL   Acetaminophen level    " Collection Time: 10/28/24 12:31 PM   Result Value Ref Range    Acetaminophen Level <3.0 (L) 10.0 - 30.0 ug/ml   CBC with Differential    Collection Time: 10/28/24 12:31 PM   Result Value Ref Range    WBC 7.83 4.50 - 11.50 x10(3)/mcL    RBC 4.47 4.20 - 5.40 x10(6)/mcL    Hgb 14.0 12.0 - 16.0 g/dL    Hct 41.7 37.0 - 47.0 %    MCV 93.3 80.0 - 94.0 fL    MCH 31.3 (H) 27.0 - 31.0 pg    MCHC 33.6 33.0 - 36.0 g/dL    RDW 11.6 11.5 - 17.0 %    Platelet 335 130 - 400 x10(3)/mcL    MPV 9.0 7.4 - 10.4 fL    Neut % 53.7 %    Lymph % 31.7 %    Mono % 10.3 %    Eos % 3.8 %    Basophil % 0.4 %    Lymph # 2.48 0.6 - 4.6 x10(3)/mcL    Neut # 4.20 2.1 - 9.2 x10(3)/mcL    Mono # 0.81 0.1 - 1.3 x10(3)/mcL    Eos # 0.30 0 - 0.9 x10(3)/mcL    Baso # 0.03 <=0.2 x10(3)/mcL    IG# 0.01 0 - 0.04 x10(3)/mcL    IG% 0.1 %   EKG 12-lead    Collection Time: 10/28/24 12:57 PM   Result Value Ref Range    QRS Duration 74 ms    OHS QTC Calculation 432 ms   Pregnancy, urine rapid    Collection Time: 10/28/24  2:11 PM   Result Value Ref Range    hCG Qualitative, Urine Negative Negative   Urinalysis, Reflex to Urine Culture    Collection Time: 10/28/24  2:12 PM    Specimen: Urine   Result Value Ref Range    Color, UA Yellow Yellow, Light-Yellow, Dark Yellow, Fang, Straw    Appearance, UA Slightly Cloudy (A) Clear    Specific Gravity, UA 1.025 1.005 - 1.030    pH, UA 6.0 5.0 - 8.5    Protein, UA Negative Negative    Glucose, UA Negative Negative, Normal    Ketones, UA Trace (A) Negative    Blood, UA 1+ (A) Negative    Bilirubin, UA Negative Negative    Urobilinogen, UA 1.0 0.2, 1.0, Normal    Nitrites, UA Positive (A) Negative    Leukocyte Esterase, UA 1+ (A) Negative   Drug Screen panel, emergency    Collection Time: 10/28/24  2:12 PM   Result Value Ref Range    Amphetamines, Urine Positive (A) Negative    Barbiturates, Urine Negative Negative    Benzodiazepine, Urine Negative Negative    Cannabinoids, Urine Negative Negative    Cocaine, Urine Negative  Negative    Fentanyl, Urine Positive (A) Negative    MDMA, Urine Negative Negative    Opiates, Urine Negative Negative    Phencyclidine, Urine Negative Negative    pH, Urine 6.0 3.0 - 11.0    Specific Gravity, Urine Auto 1.025 1.001 - 1.035   Urinalysis, Microscopic    Collection Time: 10/28/24  2:12 PM   Result Value Ref Range    Bacteria, UA Many (A) None Seen, Rare, Occasional /HPF    Mucous, UA Trace (A) None Seen /LPF    RBC, UA 0-5 None Seen, 0-2, 3-5, 0-5 /HPF    WBC, UA 21-50 (A) None Seen, 0-2, 3-5, 0-5 /HPF    Squamous Epithelial Cells, UA Few (A) None Seen, Rare, Occasional, Occ /HPF   Hemoglobin A1C    Collection Time: 10/29/24  6:33 AM   Result Value Ref Range    Hemoglobin A1c 5.0 <=7.0 %    Estimated Average Glucose 96.8 mg/dL   TSH    Collection Time: 10/29/24  6:33 AM   Result Value Ref Range    TSH 2.478 0.350 - 4.940 uIU/mL   Lipid Panel    Collection Time: 10/29/24  6:33 AM   Result Value Ref Range    Cholesterol Total 142 <=200 mg/dL    HDL Cholesterol 38 35 - 60 mg/dL    Triglyceride 70 37 - 140 mg/dL    Cholesterol/HDL Ratio 4 0 - 5    Very Low Density Lipoprotein 14     LDL Cholesterol 90.00 50.00 - 140.00 mg/dL   Comprehensive Metabolic Panel    Collection Time: 10/29/24  6:33 AM   Result Value Ref Range    Sodium 139 136 - 145 mmol/L    Potassium 4.3 3.5 - 5.1 mmol/L    Chloride 109 (H) 98 - 107 mmol/L    CO2 23 22 - 29 mmol/L    Glucose 86 74 - 100 mg/dL    Blood Urea Nitrogen 11.6 7.0 - 18.7 mg/dL    Creatinine 0.73 0.55 - 1.02 mg/dL    Calcium 9.3 8.4 - 10.2 mg/dL    Protein Total 6.6 6.4 - 8.3 gm/dL    Albumin 3.8 3.5 - 5.0 g/dL    Globulin 2.8 2.4 - 3.5 gm/dL    Albumin/Globulin Ratio 1.4 1.1 - 2.0 ratio    Bilirubin Total 0.5 <=1.5 mg/dL    ALP 83 40 - 150 unit/L    ALT 11 0 - 55 unit/L    AST 16 5 - 34 unit/L    eGFR >60 mL/min/1.73/m2    Anion Gap 7.0 mEq/L    BUN/Creatinine Ratio 16    CBC with Differential    Collection Time: 10/29/24  6:33 AM   Result Value Ref Range    WBC 6.17  "4.50 - 11.50 x10(3)/mcL    RBC 4.32 4.20 - 5.40 x10(6)/mcL    Hgb 13.5 12.0 - 16.0 g/dL    Hct 40.5 37.0 - 47.0 %    MCV 93.8 80.0 - 94.0 fL    MCH 31.3 (H) 27.0 - 31.0 pg    MCHC 33.3 33.0 - 36.0 g/dL    RDW 11.6 11.5 - 17.0 %    Platelet 337 130 - 400 x10(3)/mcL    MPV 9.2 7.4 - 10.4 fL    Neut % 35.6 %    Lymph % 47.5 %    Mono % 11.0 %    Eos % 5.2 %    Basophil % 0.5 %    Lymph # 2.93 0.6 - 4.6 x10(3)/mcL    Neut # 2.20 2.1 - 9.2 x10(3)/mcL    Mono # 0.68 0.1 - 1.3 x10(3)/mcL    Eos # 0.32 0 - 0.9 x10(3)/mcL    Baso # 0.03 <=0.2 x10(3)/mcL    IG# 0.01 0 - 0.04 x10(3)/mcL    IG% 0.2 %    NRBC% 0.0 %      No results found for: "PHENYTOIN", "PHENOBARB", "VALPROATE", "CBMZ"        Psychiatric Mental Status Exam:  General Appearance: appears stated age, well developed and nourished, adequately groomed and appropriately dressed, in no acute distress  Arousal: alert  Behavior: normal; cooperative; reasonably friendly, pleasant, and polite; appropriate eye-contact; under good behavioral control  Movements and Motor Activity: no abnormal involuntary movements noted; no tics, no tremors, no akathisia, no dystonia, no evidence of tardive dyskinesia; no psychomotor agitation or retardation  Orientation: oriented to person, place, time, and situation  Speech: increased latency of response  Mood: Dysphoric  Affect: constricted  Thought Process: intact, linear, goal-directed, organized, logical  Associations: intact, no loosening of associations  Thought Content and Perceptions: no suicidal or homicidal ideation, no auditory or visual hallucinations, no paranoid ideation, no ideas of reference, no evidence of delusions or psychosis  Recent and Remote Memory: grossly intact, able to recall relevant and salient information from the recent and remote past; per interview/observation with patient  Attention and Concentration: intact; per interview/observation with patient  Fund of Knowledge: intact; based on history, vocabulary, " fund of knowledge, syntax, grammar, and content  Insight: questionable; based on understanding of severity of illness and HPI  Judgment: questionable; based on patient's behavior and HPI      Patient Strengths:  Access to care, Able to verbalize needs, Motivation for treatment, and Capable of independent living      Patient Liabilities:  Substance use and Housing stressors      Discharge Criteria:  Improved mood, Improved thought process, and Improved coping skills      Reason for Admission:  The patient poses a significant and immediate danger to self.    ASSESSMENT/PLAN:   Diagnoses:  SUBSTANCE-RELATED DISORDERS; Amphetamine Related Disorders; Amphetamine Abuse  and MOOD DISORDERS; Mood Disorder NOS (F39)       Past Medical History:   Diagnosis Date    Anxiety disorder, unspecified     Depression     Fibromyalgia     History of psychiatric hospitalization     Hx of psychiatric care     Substance abuse           Problem lists and Management Plans:  -Admit to [facility]  -Will defer medication at this time due to lack of evidence of treatable disorder    Meth abuse  -Group/individual therapy  -Rehab placement    -Will attempt to obtain outside psychiatric records if available  - to assist with aftercare planning and collateral  -Continue inpatient treatment as evidenced by danger to self      Estimated length of stay: 5-7    Estimated Disposition: Substance treatment program    Estimated Follow-up: Substance treatment program          [facility]

## 2024-10-29 NOTE — PLAN OF CARE
Problem: Adult Behavioral Health Plan of Care  Goal: Plan of Care Review  Outcome: Not Progressing  Goal: Patient-Specific Goal (Individualization)  Outcome: Not Progressing  Goal: Adheres to Safety Considerations for Self and Others  Outcome: Not Progressing  Goal: Absence of New-Onset Illness or Injury  Outcome: Not Progressing  Goal: Optimized Coping Skills in Response to Life Stressors  Outcome: Not Progressing  Goal: Develops/Participates in Therapeutic [place] to Support Successful Transition  Outcome: Not Progressing  Goal: Rounds/Family Conference  Outcome: Not Progressing     Problem: Violence Risk or Actual  Goal: Anger and Impulse Control  Outcome: Not Progressing     Problem: Depressive Signs/Symptoms  Goal: Optimized Energy Level (Depressive Signs/Symptoms)  Outcome: Not Progressing  Goal: Optimized Cognitive Function (Depressive Signs/Symptoms)  Outcome: Not Progressing  Goal: Increased Participation and Engagement (Depressive Signs/Symptoms)  Outcome: Not Progressing  Goal: Enhanced Self-Esteem and Confidence (Depressive Signs/Symptoms)  Outcome: Not Progressing  Goal: Improved Mood Symptoms (Depressive Signs/Symptoms)  Outcome: Not Progressing  Goal: Optimized Nutrition Intake (Depressive Signs/Symptoms)  Outcome: Not Progressing  Goal: Improved Psychomotor Symptoms (Depressive Signs/Symptoms)  Outcome: Not Progressing  Goal: Improved Sleep (Depressive Signs/Symptoms)  Outcome: Not Progressing  Goal: Enhanced Social, Occupational or Functional Skills (Depressive Signs/Symptoms)  Outcome: Not Progressing     Problem: Psychotic Signs/Symptoms  Goal: Improved Behavioral Control (Psychotic Signs/Symptoms)  Outcome: Not Progressing  Goal: Optimal Cognitive Function (Psychotic Signs/Symptoms)  Outcome: Not Progressing  Goal: Increased Participation and Engagement (Psychotic Signs/Symptoms)  Outcome: Not Progressing  Goal: Improved Mood Symptoms (Psychotic Signs/Symptoms)  Outcome: Not Progressing  Goal:  "Improved Psychomotor Symptoms (Psychotic Signs/Symptoms)  Outcome: Not Progressing  Goal: Decreased Sensory Symptoms (Psychotic Signs/Symptoms)  Outcome: Not Progressing  Goal: Improved Sleep (Psychotic Signs/Symptoms)  Outcome: Not Progressing  Goal: Enhanced Social, Occupational or Functional Skills (Psychotic Signs/Symptoms)  Outcome: Not Progressing       29 y/o female PEC from [facility] due to SI w/o plan and " tweaking and paranoid". UDS + Amphetamine and Fentanyl. PMH of anxiety, depression, and substance abuse. Patient appears unkempt, flat affect. Calm and cooperative during admit. At this time, patient denies SI/ HI, A/V Hallucinations, depression, and anxiety. Stated reason for admit " I had a lot going on and needed someone to talk to because I feel no one would understand". Q15  min rounds initiated. Plan of care ongoing.   "

## 2024-10-29 NOTE — H&P
[facility]  History & Physical    Subjective:      Chief Complaint/Reason for Admission: amphetamine abuse     Christen Hawthorne is a 28 y.o. female. Amphetamine abuse     Past Medical History:   Diagnosis Date    Anxiety disorder, unspecified     Depression     Fibromyalgia     History of psychiatric hospitalization     Hx of psychiatric care     Substance abuse      Past Surgical History:   Procedure Laterality Date    BILATERAL TUBAL LIGATION       Family History   Problem Relation Name Age of Onset    Breast cancer Paternal Grandmother          age unknown    Hypothyroidism Father      Hypothyroidism Mother      Hypertension Mother      Breast cancer Paternal Aunt  30        30s per Pt    Cervical cancer Paternal Cousin  20        20s per Pt    Colon cancer Neg Hx      Ovarian cancer Neg Hx      Uterine cancer Neg Hx       Social History     Tobacco Use    Smoking status: Every Day     Current packs/day: 0.50     Types: Cigarettes     Passive exposure: Current    Smokeless tobacco: Never    Tobacco comments:     Boyfriend smokes   Substance Use Topics    Alcohol use: Not Currently    Drug use: Yes     Types: Amphetamines, Fentanyl       PTA Medications   Medication Sig    buprenorphine HCL (SUBUTEX) 8 mg Subl Take 2.5 tablets daily    cephALEXin (KEFLEX) 250 MG capsule Take 1 capsule (250 mg total) by mouth every 8 (eight) hours. for 5 days    ibuprofen (ADVIL,MOTRIN) 800 MG tablet Take 800 mg by mouth every 6 (six) hours as needed.    lisdexamfetamine (VYVANSE) 50 MG capsule Take 1 capsule (50 mg total) by mouth every morning.    megestroL (MEGACE ES) 625 mg/5 mL (125 mg/mL) Susp Take by mouth.    VENTOLIN HFA 90 mcg/actuation inhaler Inhale 2 puffs into the lungs every 4 (four) hours as needed for Wheezing.     Review of patient's allergies indicates:   Allergen Reactions    Zithromax z-ara [azithromycin] Nausea And Vomiting        Review of Systems   Constitutional:  Negative.    HENT: Negative.     Eyes: Negative.    Respiratory: Negative.     Cardiovascular: Negative.    Gastrointestinal: Negative.    Genitourinary: Negative.    Musculoskeletal: Negative.    Skin: Negative.    Neurological: Negative.    Endo/Heme/Allergies: Negative.    Psychiatric/Behavioral:  Positive for substance abuse. Negative for depression, hallucinations and suicidal ideas. The patient is nervous/anxious.        Objective:      Vital Signs (Most Recent)  Temp: 98.1 °F (36.7 °C) (10/29/24 1100)  Pulse: 65 (10/29/24 1100)  Resp: 17 (10/29/24 1100)  BP: 118/77 (10/29/24 1100)  SpO2: 98 % (10/29/24 1100)    Vital Signs Range (Last 24H):  Temp:  [98.1 °F (36.7 °C)-98.6 °F (37 °C)]   Pulse:  [65-98]   Resp:  [15-22]   BP: (105-124)/(70-86)   SpO2:  [98 %-100 %]     Physical Exam  HENT:      Head: Normocephalic.      Right Ear: Tympanic membrane normal.      Left Ear: Tympanic membrane normal.      Nose: Nose normal.      Mouth/Throat:      Mouth: Mucous membranes are moist.   Eyes:      Extraocular Movements: Extraocular movements intact.      Pupils: Pupils are equal, round, and reactive to light.   Cardiovascular:      Rate and Rhythm: Normal rate and regular rhythm.   Pulmonary:      Effort: Pulmonary effort is normal.   Abdominal:      General: Abdomen is flat.   Musculoskeletal:         General: Normal range of motion.   Skin:     General: Skin is warm.   Neurological:      General: No focal deficit present.      Mental Status: She is alert and oriented to person, place, and time.      Comments: Vision normal   Hearing normal   EOM intact   Face muscles normal  Facial sensation normal   Shrugs shoulders  Tongue midline            Data Review:    Recent Results (from the past 48 hours)   Comprehensive metabolic panel    Collection Time: 10/28/24 12:31 PM   Result Value Ref Range    Sodium 144 136 - 145 mmol/L    Potassium 4.6 3.5 - 5.1 mmol/L    Chloride 110 (H) 98 - 107 mmol/L    CO2 26 22 - 29 mmol/L     Glucose 109 (H) 74 - 100 mg/dL    Blood Urea Nitrogen 8.0 7.0 - 18.7 mg/dL    Creatinine 0.82 0.55 - 1.02 mg/dL    Calcium 9.8 8.4 - 10.2 mg/dL    Protein Total 7.6 6.4 - 8.3 gm/dL    Albumin 4.1 3.5 - 5.0 g/dL    Globulin 3.5 2.4 - 3.5 gm/dL    Albumin/Globulin Ratio 1.2 1.1 - 2.0 ratio    Bilirubin Total 0.7 <=1.5 mg/dL    ALP 94 40 - 150 unit/L    ALT 16 0 - 55 unit/L    AST 21 5 - 34 unit/L    eGFR >60 mL/min/1.73/m2    Anion Gap 8.0 mEq/L    BUN/Creatinine Ratio 10    TSH    Collection Time: 10/28/24 12:31 PM   Result Value Ref Range    TSH 1.708 0.350 - 4.940 uIU/mL   Ethanol    Collection Time: 10/28/24 12:31 PM   Result Value Ref Range    Ethanol Level <10.0 <=10.0 mg/dL   Acetaminophen level    Collection Time: 10/28/24 12:31 PM   Result Value Ref Range    Acetaminophen Level <3.0 (L) 10.0 - 30.0 ug/ml   CBC with Differential    Collection Time: 10/28/24 12:31 PM   Result Value Ref Range    WBC 7.83 4.50 - 11.50 x10(3)/mcL    RBC 4.47 4.20 - 5.40 x10(6)/mcL    Hgb 14.0 12.0 - 16.0 g/dL    Hct 41.7 37.0 - 47.0 %    MCV 93.3 80.0 - 94.0 fL    MCH 31.3 (H) 27.0 - 31.0 pg    MCHC 33.6 33.0 - 36.0 g/dL    RDW 11.6 11.5 - 17.0 %    Platelet 335 130 - 400 x10(3)/mcL    MPV 9.0 7.4 - 10.4 fL    Neut % 53.7 %    Lymph % 31.7 %    Mono % 10.3 %    Eos % 3.8 %    Basophil % 0.4 %    Lymph # 2.48 0.6 - 4.6 x10(3)/mcL    Neut # 4.20 2.1 - 9.2 x10(3)/mcL    Mono # 0.81 0.1 - 1.3 x10(3)/mcL    Eos # 0.30 0 - 0.9 x10(3)/mcL    Baso # 0.03 <=0.2 x10(3)/mcL    IG# 0.01 0 - 0.04 x10(3)/mcL    IG% 0.1 %   EKG 12-lead    Collection Time: 10/28/24 12:57 PM   Result Value Ref Range    QRS Duration 74 ms    [facility] QTC Calculation 432 ms   Pregnancy, urine rapid    Collection Time: 10/28/24  2:11 PM   Result Value Ref Range    hCG Qualitative, Urine Negative Negative   Urinalysis, Reflex to Urine Culture    Collection Time: 10/28/24  2:12 PM    Specimen: Urine   Result Value Ref Range    Color, UA Yellow Yellow, Light-Yellow, Dark  Yellow, Fang, Straw    Appearance, UA Slightly Cloudy (A) Clear    Specific Gravity, UA 1.025 1.005 - 1.030    pH, UA 6.0 5.0 - 8.5    Protein, UA Negative Negative    Glucose, UA Negative Negative, Normal    Ketones, UA Trace (A) Negative    Blood, UA 1+ (A) Negative    Bilirubin, UA Negative Negative    Urobilinogen, UA 1.0 0.2, 1.0, Normal    Nitrites, UA Positive (A) Negative    Leukocyte Esterase, UA 1+ (A) Negative   Drug Screen panel, emergency    Collection Time: 10/28/24  2:12 PM   Result Value Ref Range    Amphetamines, Urine Positive (A) Negative    Barbiturates, Urine Negative Negative    Benzodiazepine, Urine Negative Negative    Cannabinoids, Urine Negative Negative    Cocaine, Urine Negative Negative    Fentanyl, Urine Positive (A) Negative    MDMA, Urine Negative Negative    Opiates, Urine Negative Negative    Phencyclidine, Urine Negative Negative    pH, Urine 6.0 3.0 - 11.0    Specific Gravity, Urine Auto 1.025 1.001 - 1.035   Urinalysis, Microscopic    Collection Time: 10/28/24  2:12 PM   Result Value Ref Range    Bacteria, UA Many (A) None Seen, Rare, Occasional /HPF    Mucous, UA Trace (A) None Seen /LPF    RBC, UA 0-5 None Seen, 0-2, 3-5, 0-5 /HPF    WBC, UA 21-50 (A) None Seen, 0-2, 3-5, 0-5 /HPF    Squamous Epithelial Cells, UA Few (A) None Seen, Rare, Occasional, Occ /HPF   Urine culture    Collection Time: 10/28/24  2:12 PM    Specimen: Urine   Result Value Ref Range    Urine Culture >/= 100,000 colonies/ml Gram-negative Rods (A)    Hemoglobin A1C    Collection Time: 10/29/24  6:33 AM   Result Value Ref Range    Hemoglobin A1c 5.0 <=7.0 %    Estimated Average Glucose 96.8 mg/dL   SYPHILIS ANTIBODY (WITH REFLEX RPR)    Collection Time: 10/29/24  6:33 AM   Result Value Ref Range    Syphilis Antibody Nonreactive Nonreactive, Equivocal   TSH    Collection Time: 10/29/24  6:33 AM   Result Value Ref Range    TSH 2.478 0.350 - 4.940 uIU/mL   Lipid Panel    Collection Time: 10/29/24  6:33 AM    Result Value Ref Range    Cholesterol Total 142 <=200 mg/dL    HDL Cholesterol 38 35 - 60 mg/dL    Triglyceride 70 37 - 140 mg/dL    Cholesterol/HDL Ratio 4 0 - 5    Very Low Density Lipoprotein 14     LDL Cholesterol 90.00 50.00 - 140.00 mg/dL   Comprehensive Metabolic Panel    Collection Time: 10/29/24  6:33 AM   Result Value Ref Range    Sodium 139 136 - 145 mmol/L    Potassium 4.3 3.5 - 5.1 mmol/L    Chloride 109 (H) 98 - 107 mmol/L    CO2 23 22 - 29 mmol/L    Glucose 86 74 - 100 mg/dL    Blood Urea Nitrogen 11.6 7.0 - 18.7 mg/dL    Creatinine 0.73 0.55 - 1.02 mg/dL    Calcium 9.3 8.4 - 10.2 mg/dL    Protein Total 6.6 6.4 - 8.3 gm/dL    Albumin 3.8 3.5 - 5.0 g/dL    Globulin 2.8 2.4 - 3.5 gm/dL    Albumin/Globulin Ratio 1.4 1.1 - 2.0 ratio    Bilirubin Total 0.5 <=1.5 mg/dL    ALP 83 40 - 150 unit/L    ALT 11 0 - 55 unit/L    AST 16 5 - 34 unit/L    eGFR >60 mL/min/1.73/m2    Anion Gap 7.0 mEq/L    BUN/Creatinine Ratio 16    CBC with Differential    Collection Time: 10/29/24  6:33 AM   Result Value Ref Range    WBC 6.17 4.50 - 11.50 x10(3)/mcL    RBC 4.32 4.20 - 5.40 x10(6)/mcL    Hgb 13.5 12.0 - 16.0 g/dL    Hct 40.5 37.0 - 47.0 %    MCV 93.8 80.0 - 94.0 fL    MCH 31.3 (H) 27.0 - 31.0 pg    MCHC 33.3 33.0 - 36.0 g/dL    RDW 11.6 11.5 - 17.0 %    Platelet 337 130 - 400 x10(3)/mcL    MPV 9.2 7.4 - 10.4 fL    Neut % 35.6 %    Lymph % 47.5 %    Mono % 11.0 %    Eos % 5.2 %    Basophil % 0.5 %    Lymph # 2.93 0.6 - 4.6 x10(3)/mcL    Neut # 2.20 2.1 - 9.2 x10(3)/mcL    Mono # 0.68 0.1 - 1.3 x10(3)/mcL    Eos # 0.32 0 - 0.9 x10(3)/mcL    Baso # 0.03 <=0.2 x10(3)/mcL    IG# 0.01 0 - 0.04 x10(3)/mcL    IG% 0.2 %    NRBC% 0.0 %        No results found.       Assessment and Plan       Polysubstance abuse- UDS positive for amphetamines and fentanyl

## 2024-10-30 LAB — BACTERIA UR CULT: ABNORMAL

## 2024-10-30 PROCEDURE — 25000003 PHARM REV CODE 250: Performed by: INTERNAL MEDICINE

## 2024-10-30 PROCEDURE — 12400001 HC PSYCH SEMI-PRIVATE ROOM

## 2024-10-30 PROCEDURE — S4991 NICOTINE PATCH NONLEGEND: HCPCS | Performed by: PSYCHIATRY & NEUROLOGY

## 2024-10-30 PROCEDURE — 25000003 PHARM REV CODE 250: Performed by: PSYCHIATRY & NEUROLOGY

## 2024-10-30 RX ADMIN — TRAZODONE HYDROCHLORIDE 100 MG: 100 TABLET ORAL at 08:10

## 2024-10-30 RX ADMIN — CEPHALEXIN 250 MG: 250 CAPSULE ORAL at 08:10

## 2024-10-30 RX ADMIN — BUPRENORPHINE 8 MG: 8 TABLET SUBLINGUAL at 11:10

## 2024-10-30 RX ADMIN — CEPHALEXIN 250 MG: 250 CAPSULE ORAL at 11:10

## 2024-10-30 RX ADMIN — NICOTINE 1 PATCH: 21 PATCH, EXTENDED RELEASE TRANSDERMAL at 11:10

## 2024-10-30 RX ADMIN — BUPRENORPHINE 8 MG: 8 TABLET SUBLINGUAL at 08:10

## 2024-10-30 NOTE — PROGRESS NOTES
Refused despite encouragement, Alternative materials were offered.    10/30/24 1500   [facility] Group Therapy   Group Name Therapeutic Recreation   Specific Interventions Coping Skills Training   Participation Level None   Participation Quality Refused;Sleeping

## 2024-10-30 NOTE — CARE UPDATE
Treatment Team  Pt seen for treatment team today with interdisciplinary team. Pt was flat, withdrawn but cooperative. Pt stated the police brought her to the ed because she was walking about and confused. Pt stated she denied wanting to harm self when asked by the police. Pt denied hx of inpatient tx. Pt verbalized understanding of care plan. Pt does not know dc location at this time due to her bf being picked up on a parole violation. Staff will continue to work with pt on dc plan.

## 2024-10-30 NOTE — PLAN OF CARE
"Christen attended interdisciplinary treatment team, was confused but cooperative reporting "I don't know why I'm here, the police was called", has poor insight regarding her polysubstance abuse and mental health, focused on discharge, and is not progressing towards interdisciplinary treatment goal of Improved Mood Symptoms.    Christen excused from TR groups due to PRN medication and attends her ADL's.  "

## 2024-10-30 NOTE — PROGRESS NOTES
"10/30/2024  Christen Hawthorne   1995   [id number]        Psychiatry Progress Note     Chief Complaint: "I'm ready to go"    SUBJECTIVE:   Christen Hawthorne is a 28 y.o. female placed under a PEC at [facility] after presenting with suicidal ideation following methamphetamine use.     Odd affect at times per staff.  UDS was positive for amphetamines.  She is prescribed Vyvanse but does admit to methamphetamine use.    Patient states that she had been walking around and confused when the police picked her up.  Reports that she denied suicidal ideation but was taken to the ED regardless.  Advised her to take her medications this morning (had not taken them yet).  Staff working on placement.  Will continue current treatment plan and will monitor for the need to augment.       UDS: (+)amphetamines, fentanyl  Blood alcohol: <10      Current Medications:   Scheduled Meds:    buprenorphine HCL  8 mg Sublingual BID    cephALEXin  250 mg Oral TID    nicotine  1 patch Transdermal Daily      PRN Meds:   Current Facility-Administered Medications:     acetaminophen, 650 mg, Oral, Q6H PRN    aluminum-magnesium hydroxide-simethicone, 30 mL, Oral, Q6H PRN    haloperidoL, 10 mg, Oral, Q4H PRN **AND** diphenhydrAMINE, 50 mg, Oral, Q4H PRN **AND** LORazepam, 2 mg, Oral, Q4H PRN **AND** haloperidol lactate, 10 mg, Intramuscular, Q4H PRN **AND** diphenhydrAMINE, 50 mg, Intramuscular, Q4H PRN **AND** lorazepam, 2 mg, Intramuscular, Q4H PRN    hydrOXYzine HCL, 50 mg, Oral, Q4H PRN    magnesium hydroxide 400 mg/5 ml, 30 mL, Oral, Daily PRN    ondansetron, 4 mg, Oral, Q6H PRN    traZODone, 100 mg, Oral, Nightly PRN   Psychotherapeutics (From admission, onward)      Start     Stop Route Frequency Ordered    10/28/24 1845  traZODone tablet 100 mg         -- Oral Nightly PRN 10/28/24 1845    10/28/24 1845  haloperidoL tablet 10 mg  (Med - Acute  Behavioral Management)        Placed in "And" Linked Group    -- Oral Every 4 hours PRN 10/28/24 1845    " "10/28/24 1845  LORazepam tablet 2 mg  (Med - Acute  Behavioral Management)        Placed in "And" Linked Group    -- Oral Every 4 hours PRN 10/28/24 1845    10/28/24 1845  haloperidol lactate injection 10 mg  (Med - Acute  Behavioral Management)        Placed in "And" Linked Group    -- IM Every 4 hours PRN 10/28/24 1845    10/28/24 1845  LORazepam injection 2 mg  (Med - Acute  Behavioral Management)        Placed in "And" Linked Group    -- IM Every 4 hours PRN 10/28/24 1845            Allergies:   Review of patient's allergies indicates:   Allergen Reactions    Zithromax z-ara [azithromycin] Nausea And Vomiting        OBJECTIVE:   Vitals   Vitals:    10/30/24 0701   BP: 114/78   Pulse: 99   Resp: 20   Temp: 98.6 °F (37 °C)        Labs/Imaging/Studies:   Recent Results (from the past 36 hours)   Hemoglobin A1C    Collection Time: 10/29/24  6:33 AM   Result Value Ref Range    Hemoglobin A1c 5.0 <=7.0 %    Estimated Average Glucose 96.8 mg/dL   SYPHILIS ANTIBODY (WITH REFLEX RPR)    Collection Time: 10/29/24  6:33 AM   Result Value Ref Range    Syphilis Antibody Nonreactive Nonreactive, Equivocal   TSH    Collection Time: 10/29/24  6:33 AM   Result Value Ref Range    TSH 2.478 0.350 - 4.940 uIU/mL   Lipid Panel    Collection Time: 10/29/24  6:33 AM   Result Value Ref Range    Cholesterol Total 142 <=200 mg/dL    HDL Cholesterol 38 35 - 60 mg/dL    Triglyceride 70 37 - 140 mg/dL    Cholesterol/HDL Ratio 4 0 - 5    Very Low Density Lipoprotein 14     LDL Cholesterol 90.00 50.00 - 140.00 mg/dL   Comprehensive Metabolic Panel    Collection Time: 10/29/24  6:33 AM   Result Value Ref Range    Sodium 139 136 - 145 mmol/L    Potassium 4.3 3.5 - 5.1 mmol/L    Chloride 109 (H) 98 - 107 mmol/L    CO2 23 22 - 29 mmol/L    Glucose 86 74 - 100 mg/dL    Blood Urea Nitrogen 11.6 7.0 - 18.7 mg/dL    Creatinine 0.73 0.55 - 1.02 mg/dL    Calcium 9.3 8.4 - 10.2 mg/dL    Protein Total 6.6 6.4 - 8.3 gm/dL    Albumin 3.8 3.5 - 5.0 g/dL    " "Globulin 2.8 2.4 - 3.5 gm/dL    Albumin/Globulin Ratio 1.4 1.1 - 2.0 ratio    Bilirubin Total 0.5 <=1.5 mg/dL    ALP 83 40 - 150 unit/L    ALT 11 0 - 55 unit/L    AST 16 5 - 34 unit/L    eGFR >60 mL/min/1.73/m2    Anion Gap 7.0 mEq/L    BUN/Creatinine Ratio 16    CBC with Differential    Collection Time: 10/29/24  6:33 AM   Result Value Ref Range    WBC 6.17 4.50 - 11.50 x10(3)/mcL    RBC 4.32 4.20 - 5.40 x10(6)/mcL    Hgb 13.5 12.0 - 16.0 g/dL    Hct 40.5 37.0 - 47.0 %    MCV 93.8 80.0 - 94.0 fL    MCH 31.3 (H) 27.0 - 31.0 pg    MCHC 33.3 33.0 - 36.0 g/dL    RDW 11.6 11.5 - 17.0 %    Platelet 337 130 - 400 x10(3)/mcL    MPV 9.2 7.4 - 10.4 fL    Neut % 35.6 %    Lymph % 47.5 %    Mono % 11.0 %    Eos % 5.2 %    Basophil % 0.5 %    Lymph # 2.93 0.6 - 4.6 x10(3)/mcL    Neut # 2.20 2.1 - 9.2 x10(3)/mcL    Mono # 0.68 0.1 - 1.3 x10(3)/mcL    Eos # 0.32 0 - 0.9 x10(3)/mcL    Baso # 0.03 <=0.2 x10(3)/mcL    IG# 0.01 0 - 0.04 x10(3)/mcL    IG% 0.2 %    NRBC% 0.0 %          Medical Review Of Systems:  Constitutional: negative  Respiratory: negative  Cardiovascular: negative  Gastrointestinal: negative  Genitourinary:negative  Musculoskeletal:negative  Neurological: negative       Psychiatric Mental Status Exam:  General Appearance: appears stated age, well-developed, well-nourished  Arousal: alert  Behavior: irritable  Movements and Motor Activity: no abnormal involuntary movements noted  Orientation: oriented to person, place, time, and situation  Speech: normal rate, normal rhythm, normal volume, normal tone  Mood: "Ok"  Affect: constricted  Thought Process: linear  Associations: intact  Thought Content and Perceptions: no suicidal ideation, no homicidal ideation, no auditory hallucinations, no visual hallucinations, no paranoid ideation, no ideas of reference  Recent and Remote Memory: recent memory intact, remote memory intact; per interview/observation with patient  Attention and Concentration: intact, attentive to " conversation; per interview/observation with patient  Fund of Knowledge: intact, aware of current events, vocabulary appropriate; based on history, vocabulary, fund of knowledge, syntax, grammar, and content  Insight: questionable; based on understanding of severity of illness and HPI  Judgment: questionable; based on patient's behavior and HPI     ASSESSMENT/PLAN:   Problems Addressed/Diagnoses:  Unspecified Mood Disorder (F39)  Amphetamine use disorder (F15.20)   Opioid use disorder (F11.20)    Past Medical History:   Diagnosis Date    Anxiety disorder, unspecified     Depression     Fibromyalgia     History of psychiatric hospitalization     Hx of psychiatric care     Substance abuse         Plan:  Mood disorder, acute  -Continue to monitor    Amphetamine use, chronic with acute exacerbation  -Group/Individual psychotherapy    Opioid use, chronic with acute exacerbation  -Continue buprenorphine    -Continue Cephalexin for UTI    Expected Disposition Plan: ROSALBA Marques M.D.

## 2024-10-30 NOTE — PLAN OF CARE
Problem: Adult Behavioral Health Plan of Care  Goal: Plan of Care Review  Outcome: Progressing  Flowsheets (Taken 10/30/2024 1526)  Patient Agreement with Plan of Care: agrees  Plan of Care Reviewed With: patient  Goal: Patient-Specific Goal (Individualization)  Outcome: Progressing  Flowsheets (Taken 10/30/2024 1526)  Patient Personal Strengths:   ability to maintain sobriety   medication/treatment adherence  Patient Vulnerabilities:   substance abuse/addiction   lacks insight into illness  Individualized Care Needs: deneis  Anxieties, Fears or Concerns: denies  Patient/Family-Specific Goals (Include Timeframe): deneis  Goal: Adheres to Safety Considerations for Self and Others  Outcome: Progressing  Flowsheets (Taken 10/30/2024 1526)  Adheres to Safety Considerations for Self and Others: achieves outcome  Intervention: Develop and Maintain Individualized Safety Plan  Flowsheets (Taken 10/30/2024 1526)  Safety Measures: environmental rounds completed  Goal: Absence of New-Onset Illness or Injury  Outcome: Progressing  Intervention: Prevent Infection  Flowsheets (Taken 10/30/2024 1526)  Infection Prevention: hand hygiene promoted  Goal: Optimized Coping Skills in Response to Life Stressors  Outcome: Progressing  Flowsheets (Taken 10/30/2024 1526)  Optimized Coping Skills in Response to Life Stressors: achieves outcome  Goal: Develops/Participates in Therapeutic [place] to Support Successful Transition  Outcome: Progressing  Flowsheets (Taken 10/30/2024 1526)  Develops/Participates in Therapeutic [place] to Support Successful Transition: achieves outcome  Goal: Rounds/Family Conference  Outcome: Progressing     Problem: Violence Risk or Actual  Goal: Anger and Impulse Control  Outcome: Progressing  Intervention: Minimize Safety Risk  Flowsheets (Taken 10/30/2024 1526)  Behavior Management: behavioral plan developed  De-Escalation Techniques:   increased round frequency   medication offered  Intervention: Promote  Self-Control  Flowsheets (Taken 10/30/2024 1526)  Supportive Measures:   active listening utilized   goal-setting facilitated  Environmental Support: calm environment promoted     Problem: Depressive Signs/Symptoms  Goal: Optimized Energy Level (Depressive Signs/Symptoms)  Outcome: Progressing  Flowsheets (Taken 10/30/2024 1526)  Mutually Determined Action Steps (Optimized Energy Level): grooms self without prompting  Goal: Optimized Cognitive Function (Depressive Signs/Symptoms)  Outcome: Progressing  Flowsheets (Taken 10/30/2024 1526)  Mutually Determined Action Steps (Optimized Cognitive Function): participates in attention training  Goal: Increased Participation and Engagement (Depressive Signs/Symptoms)  Outcome: Progressing  Flowsheets (Taken 10/30/2024 1526)  Mutually Determined Action Steps (Increased Participation and Engagement): participates in one or more activity  Goal: Enhanced Self-Esteem and Confidence (Depressive Signs/Symptoms)  Outcome: Progressing  Flowsheets (Taken 10/30/2024 1526)  Mutually Determined Action Steps (Enhanced Self-Esteem and Confidence): identifies judgmental thoughts  Goal: Improved Mood Symptoms (Depressive Signs/Symptoms)  Outcome: Progressing  Flowsheets (Taken 10/30/2024 1526)  Mutually Determined Action Steps (Improved Mood Symptoms): acknowledges progress  Intervention: Promote Mood Improvement  Flowsheets (Taken 10/30/2024 1526)  Supportive Measures:   active listening utilized   goal-setting facilitated  Goal: Optimized Nutrition Intake (Depressive Signs/Symptoms)  Outcome: Progressing  Flowsheets (Taken 10/30/2024 1526)  Mutually Determined Action Steps (Optimized Nutrition Intake): maintains baseline weight  Goal: Improved Psychomotor Symptoms (Depressive Signs/Symptoms)  Outcome: Progressing  Flowsheets (Taken 10/30/2024 1526)  Mutually Determined Action Steps (Improved Psychomotor Symptoms): adheres to medication regimen  Goal: Improved Sleep (Depressive  Signs/Symptoms)  Outcome: Progressing  Flowsheets (Taken 10/30/2024 1526)  Mutually Determined Action Steps (Improved Sleep): sleeps 4-6 hours at night  Goal: Enhanced Social, Occupational or Functional Skills (Depressive Signs/Symptoms)  Outcome: Progressing  Flowsheets (Taken 10/30/2024 1526)  Mutually Determined Action Steps (Enhanced Social, Occupational or Functional Skills): identifies support resources     Problem: Psychotic Signs/Symptoms  Goal: Improved Behavioral Control (Psychotic Signs/Symptoms)  Outcome: Progressing  Flowsheets (Taken 10/30/2024 1526)  Mutually Determined Action Steps (Improved Behavioral Control): identifies symptoms triggers  Intervention: Manage Behavior  Flowsheets (Taken 10/30/2024 1526)  De-Escalation Techniques:   increased round frequency   medication offered  Goal: Optimal Cognitive Function (Psychotic Signs/Symptoms)  Outcome: Progressing  Flowsheets (Taken 10/30/2024 1526)  Mutually Determined Action Steps (Optimal Cognitive Function): participates in attention training  Intervention: Support and Promote Cognitive Ability  Flowsheets (Taken 10/30/2024 1526)  Trust Relationship/Rapport: care explained  Goal: Increased Participation and Engagement (Psychotic Signs/Symptoms)  Outcome: Progressing  Flowsheets (Taken 10/30/2024 1526)  Mutually Determined Action Steps (Increased Participation and Engagement): identifies symptoms triggers  Intervention: Facilitate Participation and Engagement  Flowsheets (Taken 10/30/2024 1526)  Supportive Measures:   active listening utilized   goal-setting facilitated  Goal: Improved Mood Symptoms (Psychotic Signs/Symptoms)  Outcome: Progressing  Flowsheets (Taken 10/30/2024 1526)  Mutually Determined Action Steps (Improved Mood Symptoms): acknowledges progress  Intervention: Optimize Emotion and Mood  Flowsheets (Taken 10/30/2024 1526)  Supportive Measures:   active listening utilized   goal-setting facilitated  Goal: Improved Psychomotor  Symptoms (Psychotic Signs/Symptoms)  Outcome: Progressing  Flowsheets (Taken 10/30/2024 1526)  Mutually Determined Action Steps (Improved Psychomotor Symptoms): adheres to medication regimen  Goal: Decreased Sensory Symptoms (Psychotic Signs/Symptoms)  Outcome: Progressing  Flowsheets (Taken 10/30/2024 1526)  Mutually Determined Action Steps (Decreased Sensory Symptoms): adheres to medication regimen  Goal: Improved Sleep (Psychotic Signs/Symptoms)  Outcome: Progressing  Flowsheets (Taken 10/30/2024 1526)  Mutually Determined Action Steps (Improved Sleep): sleeps 4-6 hours at night  Goal: Enhanced Social, Occupational or Functional Skills (Psychotic Signs/Symptoms)  Outcome: Progressing  Flowsheets (Taken 10/30/2024 1526)  Mutually Determined Action Steps (Enhanced Social, Occupational or Functional Skills): identifies support resources  Intervention: Promote Social, Occupational and Functional Ability  Flowsheets (Taken 10/30/2024 1526)  Trust Relationship/Rapport: care explained  Social Functional Ability Promotion: autonomy promoted

## 2024-10-30 NOTE — PLAN OF CARE
Behavioral Health Unit  Psychosocial History and Assessment  Progress Note      Patient Name: Christen Hawthorne YOB: 1995 SW: Nay Solano Date: 10/30/2024    Chief Complaint: depression and suicidal ideation    Consent:     Did the patient consent for an interview with the ? Yes    Did the patient consent for the  to contact family/friend/caregiver?   Yes  Name: Khadar Hawthorne, Relationship: dad, and Contact: [phone]    Did the patient give consent for the  to inform family/friend/caregiver of his/her whereabouts or to discuss discharge planning? Yes    Source of Information: Face to face with patient    Is information obtained from interviews considered reliable?   yes    Reason for Admission:     There are no hospital problems to display for this patient.      History of Present Illness - (Patient Perception):   My boyfriend had gotten picked up and I didn't know where to go and I was confused. I stay with him all the time, so I didn't know what to do.     Present biopsychosocial functioning: unsure, cooperative    Past biopsychosocial functioning: hx of polysubstance abuse    Family and Marital/Relationship History:     Significant Other/Partner Relationships:  Partnered: 7  years, 2 children    Family Relationships: Intact      Childhood History:     Where was patient raised? [place]    Who raised the patient? Mom and dad      How does patient describe their childhood? It was alright      Who is patient's primary support person? dad      Culture and Caodaism:     Caodaism: Sikhism    How strong of a role does Tenriism and spirituality play in patient's life? 6 out of 10    Judaism or spiritual concerns regarding treatment: observation of holy days  and spiritual concerns / distress    History of Abuse:   History of Abuse: Denies      Outcome: denies    Psychiatric and Medical History:     History of psychiatric illness or treatment: none    Medical  history:   Past Medical History:   Diagnosis Date    Anxiety disorder, unspecified     Depression     Fibromyalgia     History of psychiatric hospitalization     Hx of psychiatric care     Substance abuse        Substance Abuse History:     Alcohol - (Patient Perspective): pt states that she does not drink  Social History     Substance and Sexual Activity   Alcohol Use Not Currently       Drugs - (Patient Perspective): pt states that she only did a tiny line because she didn't know what else to do. She states that she takes subutex because of her prescription pain pill addiction.   Social History     Substance and Sexual Activity   Drug Use Yes    Types: Amphetamines, Fentanyl       Additional Comments: pt states that she smokes a half pack to a pack of cigarettes daily and she also vapes    Education:     Currently Enrolled? No  High School (9-12) or GED Graduated    Special Education? No    Interested in Completing Education/GED: No    Employment and Financial:     Currently employed? Unemployed just recently    Source of Income:  recently lost job    Able to afford basic needs (food, shelter, utilities)? No    Who manages finances/personal affairs? self      Service:     [place]? no    Combat Service? No     Community Resources:     Describe present use of community resources: inpatient behavioral health     Identify previously used community resources   (Include previous mental health treatment - outpatient and inpatient): outpatient behavioral health    Environmental:     Current living situation:lives with boyfriend    Social Evaluation:     Patient Assets: General fund of knowledge    Patient Limitations: poor coping skills, unsure where she will go after discharge being that her boyfriend was picked up for parole violation.    High risk psychosocial issues that may impact discharge planning:   Unsure of where she is going at discharge    Recommendations:     Anticipated discharge plan:   outpatient  follow up    High risk issues requiring early treatment planning and immediate intervention: [facility] resources needed for discharge planning:  aftercare treatment sources    Anticipated social work role(s) in treatment and discharge planning: advice and [place], groups, individual as needed and referral to aftercare.   10/30/24 0902   Initial Information   Source of Information patient   Reason for Admission SI, polysubstance abuse   Patient Aware of Diagnosis yes   Arrived From emergency department   Substance Use/Withdrawal   Substance Use Social or intermittent use   Additional Tobacco Use   How many cigarettes do you typically have per day? 15   What additional types of tobacco do you currently use? Other (see comments)  (vape)   Abuse Screen (yes response referral indicated)   Feels Unsafe at Home or Work/School no   Feels Threatened by Someone no   Does anyone try to keep you from having contact with others or doing things outside your home? no   Physical Signs of Abuse Present no   Abuse Details   Physical Abuse No   Sexual Abuse No   Emotional Abuse No   AUDIT-C (Alcohol Use Disorders ID Test)   Alcohol Use In Past Year 0-->never   Alcohol Amount Per Day In Past Year 0-->none   More Than 6 Drinks On One Occasion In Past Year 0-->never   Total Audit C Score 0

## 2024-10-30 NOTE — PROGRESS NOTES
Refused despite encouragement, Alternative materials were offered.    10/30/24 1400   [facility] Group Therapy   Group Name Therapeutic Recreation   Specific Interventions Social Skills Training   Participation Level None   Participation Quality Refused;Sleeping

## 2024-10-30 NOTE — PLAN OF CARE
Problem: Adult Behavioral Health Plan of Care  Goal: Plan of Care Review  Outcome: Not Progressing  Flowsheets (Taken 10/29/2024 2235)  Patient Agreement with Plan of Care: agrees  Plan of Care Reviewed With: patient  Goal: Patient-Specific Goal (Individualization)  Outcome: Not Progressing  Flowsheets (Taken 10/29/2024 2235)  Patient Personal Strengths: independent living skills  Patient Vulnerabilities:   housing insecurity   substance abuse/addiction   lacks insight into illness  Goal: Adheres to Safety Considerations for Self and Others  Outcome: Not Progressing  Flowsheets (Taken 10/29/2024 2235)  Adheres to Safety Considerations for Self and Others: unable to achieve outcome  Intervention: Develop and Maintain Individualized Safety Plan  Flowsheets (Taken 10/29/2024 2235)  Safety Measures:   self-directed behavior promoted   safety rounds completed  Goal: Absence of New-Onset Illness or Injury  Outcome: Not Progressing  Goal: Optimized Coping Skills in Response to Life Stressors  Outcome: Not Progressing  Flowsheets (Taken 10/29/2024 2235)  Optimized Coping Skills in Response to Life Stressors: unable to achieve outcome  Intervention: Promote Effective Coping Strategies  Flowsheets (Taken 10/29/2024 2235)  Supportive Measures:   verbalization of feelings encouraged   self-care encouraged   active listening utilized  Goal: Develops/Participates in Therapeutic [place] to Support Successful Transition  Outcome: Not Progressing  Flowsheets (Taken 10/29/2024 2235)  Develops/Participates in Therapeutic [place] to Support Successful Transition: unable to achieve outcome  Intervention: Foster Therapeutic Drifting  Flowsheets (Taken 10/29/2024 2235)  Trust Relationship/Rapport:   care explained   questions encouraged   reassurance provided  Goal: Rounds/Family Conference  Outcome: Not Progressing  Flowsheets (Taken 10/29/2024 2235)  Participants:   milieu/psych techs   nursing     Problem: Violence Risk or  Actual  Goal: Anger and Impulse Control  Outcome: Not Progressing  Intervention: Minimize Safety Risk  Flowsheets (Taken 10/29/2024 2235)  Behavior Management: behavioral plan reviewed  Sensory Stimulation Regulation: quiet environment promoted  De-Escalation Techniques: quiet time facilitated  Enhanced Safety Measures: monitored by video  Intervention: Promote Self-Control  Flowsheets (Taken 10/29/2024 2235)  Supportive Measures:   verbalization of feelings encouraged   self-care encouraged   active listening utilized  Environmental Support: calm environment promoted     Problem: Depressive Signs/Symptoms  Goal: Optimized Energy Level (Depressive Signs/Symptoms)  Outcome: Not Progressing  Flowsheets (Taken 10/29/2024 2235)  Mutually Determined Action Steps (Optimized Energy Level): grooms self without prompting  Intervention: Optimize Energy Level  Flowsheets (Taken 10/29/2024 2235)  Activity (Behavioral Health): up ad lior  Diversional Activity: television  Goal: Optimized Cognitive Function (Depressive Signs/Symptoms)  Outcome: Not Progressing  Flowsheets (Taken 10/29/2024 2235)  Mutually Determined Action Steps (Optimized Cognitive Function): remains focused during activity  Goal: Increased Participation and Engagement (Depressive Signs/Symptoms)  Outcome: Not Progressing  Flowsheets (Taken 10/29/2024 2235)  Mutually Determined Action Steps (Increased Participation and Engagement): establishes future-oriented goal  Intervention: Facilitate Participation and Engagement  Flowsheets (Taken 10/29/2024 2235)  Supportive Measures:   verbalization of feelings encouraged   self-care encouraged   active listening utilized  Diversional Activity: television  Goal: Enhanced Self-Esteem and Confidence (Depressive Signs/Symptoms)  Outcome: Not Progressing  Flowsheets (Taken 10/29/2024 2235)  Mutually Determined Action Steps (Enhanced Self-Esteem and Confidence): identifies unrealistic self-expectation  Intervention: Promote  Confidence and Self-Esteem  Flowsheets (Taken 10/29/2024 2235)  Supportive Measures:   verbalization of feelings encouraged   self-care encouraged   active listening utilized  Goal: Improved Mood Symptoms (Depressive Signs/Symptoms)  Outcome: Not Progressing  Flowsheets (Taken 10/29/2024 2235)  Mutually Determined Action Steps (Improved Mood Symptoms): identifies personal treatment goal  Intervention: Promote Mood Improvement  Flowsheets (Taken 10/29/2024 2235)  Supportive Measures:   verbalization of feelings encouraged   self-care encouraged   active listening utilized  Diversional Activity: television  Goal: Optimized Nutrition Intake (Depressive Signs/Symptoms)  Outcome: Not Progressing  Flowsheets (Taken 10/29/2024 2235)  Mutually Determined Action Steps (Optimized Nutrition Intake): eats at meal time  Intervention: Promote Optimal Nutrition Intake  Flowsheets (Taken 10/29/2024 2235)  Bowel Elimination Promotion:   adequate fluid intake promoted   ambulation promoted  Goal: Improved Psychomotor Symptoms (Depressive Signs/Symptoms)  Outcome: Not Progressing  Flowsheets (Taken 10/29/2024 2235)  Mutually Determined Action Steps (Improved Psychomotor Symptoms): adheres to medication regimen  Intervention: Manage Psychomotor Movement  Flowsheets (Taken 10/29/2024 2235)  Activity (Behavioral Health): up ad lior  Diversional Activity: television  Goal: Improved Sleep (Depressive Signs/Symptoms)  Outcome: Not Progressing  Flowsheets (Taken 10/29/2024 2235)  Mutually Determined Action Steps (Improved Sleep): sleeps 4-6 hours at night  Intervention: Promote Healthy Sleep Hygiene  Flowsheets (Taken 10/29/2024 2235)  Sleep Hygiene Promotion:   awakenings minimized   regular sleep pattern promoted  Goal: Enhanced Social, Occupational or Functional Skills (Depressive Signs/Symptoms)  Outcome: Not Progressing  Flowsheets (Taken 10/29/2024 2235)  Mutually Determined Action Steps (Enhanced Social, Occupational or Functional  Skills): identifies personal strengths  Intervention: Promote Social, Occupational and Functional Ability  Flowsheets (Taken 10/29/2024 2235)  Social Functional Ability Promotion:   autonomy promoted   self-expression encouraged   social interaction promoted     Problem: Psychotic Signs/Symptoms  Goal: Improved Behavioral Control (Psychotic Signs/Symptoms)  Outcome: Not Progressing  Flowsheets (Taken 10/29/2024 2235)  Mutually Determined Action Steps (Improved Behavioral Control): identifies symptoms triggers  Intervention: Manage Behavior  Flowsheets (Taken 10/29/2024 2235)  De-Escalation Techniques: quiet time facilitated  Goal: Optimal Cognitive Function (Psychotic Signs/Symptoms)  Outcome: Not Progressing  Flowsheets (Taken 10/29/2024 2235)  Mutually Determined Action Steps (Optimal Cognitive Function): remains focused during activity  Intervention: Support and Promote Cognitive Ability  Flowsheets (Taken 10/29/2024 2235)  Trust Relationship/Rapport:   care explained   questions encouraged   reassurance provided  Goal: Increased Participation and Engagement (Psychotic Signs/Symptoms)  Outcome: Not Progressing  Flowsheets (Taken 10/29/2024 2235)  Mutually Determined Action Steps (Increased Participation and Engagement): verbalizes gratifying activity  Intervention: Facilitate Participation and Engagement  Flowsheets (Taken 10/29/2024 2235)  Supportive Measures:   verbalization of feelings encouraged   self-care encouraged   active listening utilized  Diversional Activity: television  Goal: Improved Mood Symptoms (Psychotic Signs/Symptoms)  Outcome: Not Progressing  Flowsheets (Taken 10/29/2024 2235)  Mutually Determined Action Steps (Improved Mood Symptoms): identifies personal treatment goal  Intervention: Optimize Emotion and Mood  Flowsheets (Taken 10/29/2024 2235)  Supportive Measures:   verbalization of feelings encouraged   self-care encouraged   active listening utilized  Diversional Activity:  "television  Goal: Improved Psychomotor Symptoms (Psychotic Signs/Symptoms)  Outcome: Not Progressing  Flowsheets (Taken 10/29/2024 2235)  Mutually Determined Action Steps (Improved Psychomotor Symptoms): adheres to medication regimen  Intervention: Manage Psychomotor Movement  Flowsheets (Taken 10/29/2024 2235)  Activity (Behavioral Health): up ad lior  Diversional Activity: television  Goal: Decreased Sensory Symptoms (Psychotic Signs/Symptoms)  Outcome: Not Progressing  Flowsheets (Taken 10/29/2024 2235)  Mutually Determined Action Steps (Decreased Sensory Symptoms): adheres to medication regimen  Intervention: Minimize and Manage Sensory Impairment  Flowsheets (Taken 10/29/2024 2235)  Sensory Stimulation Regulation: quiet environment promoted  Goal: Improved Sleep (Psychotic Signs/Symptoms)  Outcome: Not Progressing  Flowsheets (Taken 10/29/2024 2235)  Mutually Determined Action Steps (Improved Sleep): sleeps 4-6 hours at night  Intervention: Promote Healthy Sleep Hygiene  Flowsheets (Taken 10/29/2024 2235)  Sleep Hygiene Promotion:   awakenings minimized   regular sleep pattern promoted  Goal: Enhanced Social, Occupational or Functional Skills (Psychotic Signs/Symptoms)  Outcome: Not Progressing  Flowsheets (Taken 10/29/2024 2235)  Mutually Determined Action Steps (Enhanced Social, Occupational or Functional Skills): identifies personal strengths  Intervention: Promote Social, Occupational and Functional Ability  Flowsheets (Taken 10/29/2024 2235)  Trust Relationship/Rapport:   care explained   questions encouraged   reassurance provided  Social Functional Ability Promotion:   autonomy promoted   self-expression encouraged   social interaction promoted     Pt AAOx4 Pt denies SI/HI/AVH. Pt endorses Anxiety and depression. Pt states she is "irritated" because her dad called and she can't call him back until tomorrow. Pt presents as anxious and irritable.  Pt reports poor sleep and good appetite. Pt affect is flat " with good eye contact. Q15 min safety checks continued. Pt withdrawn to self and isolative to room.

## 2024-10-30 NOTE — NURSING
Patient alert and oriented x 4, stated she is here because I got confused they asked me if I am hurting myself I said no and I am ending here. Admit to taking drugs.Patient denies SI and HI ,denies anxiety and depression ,denies hearing voices. Patient per assessment is confused but easily redirected.Instructed to take medication from the med room and followed instructions.

## 2024-10-31 LAB — POCT GLUCOSE: 128 MG/DL (ref 70–110)

## 2024-10-31 PROCEDURE — 25000003 PHARM REV CODE 250: Performed by: PSYCHIATRY & NEUROLOGY

## 2024-10-31 PROCEDURE — 12400001 HC PSYCH SEMI-PRIVATE ROOM

## 2024-10-31 PROCEDURE — 25000003 PHARM REV CODE 250: Performed by: INTERNAL MEDICINE

## 2024-10-31 RX ADMIN — CEPHALEXIN 250 MG: 250 CAPSULE ORAL at 09:10

## 2024-10-31 RX ADMIN — BUPRENORPHINE 8 MG: 8 TABLET SUBLINGUAL at 08:10

## 2024-10-31 RX ADMIN — CEPHALEXIN 250 MG: 250 CAPSULE ORAL at 02:10

## 2024-10-31 RX ADMIN — BUPRENORPHINE 8 MG: 8 TABLET SUBLINGUAL at 09:10

## 2024-10-31 RX ADMIN — CEPHALEXIN 250 MG: 250 CAPSULE ORAL at 08:10

## 2024-10-31 NOTE — PROGRESS NOTES
Refused despite encouragement, Alternative materials were offered.    10/31/24 1500   [facility] Group Therapy   Group Name Therapeutic Recreation   Specific Interventions Coping Skills Training   Participation Level None   Participation Quality Refused

## 2024-10-31 NOTE — PLAN OF CARE
Problem: Adult Behavioral Health Plan of Care  Goal: Plan of Care Review  Outcome: Progressing  Flowsheets (Taken 10/31/2024 0017)  Patient Agreement with Plan of Care: agrees  Plan of Care Reviewed With: patient  Goal: Patient-Specific Goal (Individualization)  Outcome: Progressing  Flowsheets (Taken 10/31/2024 0017)  Patient Personal Strengths: no history of violence  Patient Vulnerabilities:   substance abuse/addiction   lacks insight into illness  Goal: Adheres to Safety Considerations for Self and Others  Outcome: Progressing  Flowsheets (Taken 10/31/2024 0017)  Adheres to Safety Considerations for Self and Others: making progress toward outcome  Intervention: Develop and Maintain Individualized Safety Plan  Flowsheets (Taken 10/31/2024 0017)  Safety Measures:   monitored by video   safety rounds completed  Goal: Absence of New-Onset Illness or Injury  Outcome: Progressing  Intervention: Identify and Manage Fall Risk  Flowsheets (Taken 10/31/2024 0017)  Safety Measures:   monitored by video   safety rounds completed  Intervention: Prevent VTE (Venous Thromboembolism)  Flowsheets (Taken 10/31/2024 0017)  VTE Prevention/Management:   ambulation promoted   fluids promoted  Intervention: Prevent Infection  Flowsheets (Taken 10/31/2024 0017)  Infection Prevention:   hand hygiene promoted   rest/sleep promoted  Goal: Optimized Coping Skills in Response to Life Stressors  Outcome: Progressing  Flowsheets (Taken 10/31/2024 0017)  Optimized Coping Skills in Response to Life Stressors: achieves outcome  Intervention: Promote Effective Coping Strategies  Flowsheets (Taken 10/31/2024 0017)  Supportive Measures:   active listening utilized   verbalization of feelings encouraged  Goal: Develops/Participates in Therapeutic [place] to Support Successful Transition  Outcome: Progressing  Flowsheets (Taken 10/31/2024 0017)  Develops/Participates in Therapeutic [place] to Support Successful Transition: achieves  outcome  Intervention: Foster Therapeutic [place]  Flowsheets (Taken 10/31/2024 0017)  Trust Relationship/Rapport:   care explained   thoughts/feelings acknowledged  Intervention: Mutually Develop Transition Plan  Flowsheets (Taken 10/31/2024 0017)  Current Discharge Risk:   psychiatric illness   substance use/abuse  Outpatient/Agency/Support Group Needs:   outpatient counseling   outpatient medication management  Concerns to be Addressed:   mental health   medication   substance/tobacco abuse/use  Goal: Rounds/Family Conference  Outcome: Progressing  Flowsheets (Taken 10/31/2024 0017)  Participants:   milieu/psych techs   nursing     Problem: Violence Risk or Actual  Goal: Anger and Impulse Control  Outcome: Progressing  Intervention: Minimize Safety Risk  Flowsheets (Taken 10/31/2024 0017)  Behavior Management: behavioral plan reviewed  Sensory Stimulation Regulation: quiet environment promoted  De-Escalation Techniques:   medication administered   quiet time facilitated  Intervention: Promote Self-Control  Flowsheets (Taken 10/31/2024 0017)  Supportive Measures:   active listening utilized   verbalization of feelings encouraged  Environmental Support:   calm environment promoted   rest periods encouraged     Problem: Depressive Signs/Symptoms  Goal: Optimized Energy Level (Depressive Signs/Symptoms)  Outcome: Progressing  Intervention: Optimize Energy Level  Flowsheets (Taken 10/31/2024 0017)  Activity (Behavioral Health): up ad lior  Goal: Optimized Cognitive Function (Depressive Signs/Symptoms)  Outcome: Progressing  Goal: Increased Participation and Engagement (Depressive Signs/Symptoms)  Outcome: Progressing  Intervention: Facilitate Participation and Engagement  Flowsheets (Taken 10/31/2024 0017)  Supportive Measures:   active listening utilized   verbalization of feelings encouraged  Goal: Enhanced Self-Esteem and Confidence (Depressive Signs/Symptoms)  Outcome: Progressing  Intervention: Promote Confidence  and Self-Esteem  Flowsheets (Taken 10/31/2024 0017)  Supportive Measures:   active listening utilized   verbalization of feelings encouraged  Goal: Improved Mood Symptoms (Depressive Signs/Symptoms)  Outcome: Progressing  Flowsheets (Taken 10/31/2024 0017)  Mutually Determined Action Steps (Improved Mood Symptoms): acknowledges progress  Intervention: Promote Mood Improvement  Flowsheets (Taken 10/31/2024 0017)  Supportive Measures:   active listening utilized   verbalization of feelings encouraged  Goal: Optimized Nutrition Intake (Depressive Signs/Symptoms)  Outcome: Progressing  Flowsheets (Taken 10/31/2024 0017)  Mutually Determined Action Steps (Optimized Nutrition Intake): eats at meal time  Intervention: Promote Optimal Nutrition Intake  Flowsheets (Taken 10/31/2024 0017)  Nutrition Interventions: food preferences provided  Bowel Elimination Promotion: adequate fluid intake promoted  Goal: Improved Psychomotor Symptoms (Depressive Signs/Symptoms)  Outcome: Progressing  Flowsheets (Taken 10/31/2024 0017)  Mutually Determined Action Steps (Improved Psychomotor Symptoms): adheres to medication regimen  Intervention: Manage Psychomotor Movement  Flowsheets (Taken 10/31/2024 0017)  Activity (Behavioral Health): up ad lior  Goal: Improved Sleep (Depressive Signs/Symptoms)  Outcome: Progressing  Flowsheets (Taken 10/31/2024 0017)  Mutually Determined Action Steps (Improved Sleep): sleeps 4-6 hours at night  Intervention: Promote Healthy Sleep Hygiene  Flowsheets (Taken 10/31/2024 0017)  Sleep Hygiene Promotion:   awakenings minimized   regular sleep pattern promoted  Goal: Enhanced Social, Occupational or Functional Skills (Depressive Signs/Symptoms)  Outcome: Progressing  Intervention: Promote Social, Occupational and Functional Ability  Flowsheets (Taken 10/31/2024 0017)  Social Functional Ability Promotion:   autonomy promoted   self-expression encouraged     Problem: Psychotic Signs/Symptoms  Goal: Improved  Behavioral Control (Psychotic Signs/Symptoms)  Outcome: Progressing  Intervention: Manage Behavior  Flowsheets (Taken 10/31/2024 0017)  De-Escalation Techniques:   medication administered   quiet time facilitated  Goal: Optimal Cognitive Function (Psychotic Signs/Symptoms)  Outcome: Progressing  Intervention: Support and Promote Cognitive Ability  Flowsheets (Taken 10/31/2024 0017)  Trust Relationship/Rapport:   care explained   thoughts/feelings acknowledged  Goal: Increased Participation and Engagement (Psychotic Signs/Symptoms)  Outcome: Progressing  Intervention: Facilitate Participation and Engagement  Flowsheets (Taken 10/31/2024 0017)  Supportive Measures:   active listening utilized   verbalization of feelings encouraged  Goal: Improved Mood Symptoms (Psychotic Signs/Symptoms)  Outcome: Progressing  Flowsheets (Taken 10/31/2024 0017)  Mutually Determined Action Steps (Improved Mood Symptoms):   acknowledges progress   verbalizes increased insight  Intervention: Optimize Emotion and Mood  Flowsheets (Taken 10/31/2024 0017)  Supportive Measures:   active listening utilized   verbalization of feelings encouraged  Goal: Improved Psychomotor Symptoms (Psychotic Signs/Symptoms)  Outcome: Progressing  Flowsheets (Taken 10/31/2024 0017)  Mutually Determined Action Steps (Improved Psychomotor Symptoms): adheres to medication regimen  Intervention: Manage Psychomotor Movement  Flowsheets (Taken 10/31/2024 0017)  Activity (Behavioral Health): up ad lior  Goal: Decreased Sensory Symptoms (Psychotic Signs/Symptoms)  Outcome: Progressing  Flowsheets (Taken 10/31/2024 0017)  Mutually Determined Action Steps (Decreased Sensory Symptoms): adheres to medication regimen  Intervention: Minimize and Manage Sensory Impairment  Flowsheets (Taken 10/31/2024 0017)  Sensory Stimulation Regulation: quiet environment promoted  Goal: Improved Sleep (Psychotic Signs/Symptoms)  Outcome: Progressing  Flowsheets (Taken 10/31/2024  0017)  Mutually Determined Action Steps (Improved Sleep): sleeps 4-6 hours at night  Intervention: Promote Healthy Sleep Hygiene  Flowsheets (Taken 10/31/2024 0017)  Sleep Hygiene Promotion:   awakenings minimized   regular sleep pattern promoted  Goal: Enhanced Social, Occupational or Functional Skills (Psychotic Signs/Symptoms)  Outcome: Progressing  Intervention: Promote Social, Occupational and Functional Ability  Flowsheets (Taken 10/31/2024 0017)  Trust Relationship/Rapport:   care explained   thoughts/feelings acknowledged  Social Functional Ability Promotion:   autonomy promoted   self-expression encouraged   AAOx4. Flat affect. Anxious mood. Denies suicidal ideations and hallucinations. Unkempt appearance, fair eye contact. Normal speech, rate and tone. Steady gait. Compliant with medications and request Trazodone 100 mg po for sleep. Reports that she sleeps well but wakes up throughout the night. Reports a good appetite. No detox symptoms observed. No agitation or aggression noted at this time. Continue with plan of care and q 15 minute safety checks.

## 2024-10-31 NOTE — PROGRESS NOTES
"10/31/2024  Christen Hawthorne   1995   [id number]        Psychiatry Progress Note     Chief Complaint: "I'm ready to go"    SUBJECTIVE:   Christen Hawthorne is a 28 y.o. female placed under a PEC at [facility] after presenting with suicidal ideation following methamphetamine use.     Staff reports that the patient continues to exhibit an odd affect. Patient states her mood is fine and endorses appropriate eating and sleeping patterns. She appears to be tolerating her medication regimen without issue. Currently, she is focused on discharge, citing her children as her reason; however, when questioned about her previous suicidal ideations, she became visibly irritated and left the room. Staff are working on placement arrangements. We will continue with the current treatment plan and monitor for any need to augment her care.      UDS: (+)amphetamines, fentanyl  Blood alcohol: <10      Current Medications:   Scheduled Meds:    buprenorphine HCL  8 mg Sublingual BID    cephALEXin  250 mg Oral TID    nicotine  1 patch Transdermal Daily      PRN Meds:   Current Facility-Administered Medications:     acetaminophen, 650 mg, Oral, Q6H PRN    aluminum-magnesium hydroxide-simethicone, 30 mL, Oral, Q6H PRN    haloperidoL, 10 mg, Oral, Q4H PRN **AND** diphenhydrAMINE, 50 mg, Oral, Q4H PRN **AND** LORazepam, 2 mg, Oral, Q4H PRN **AND** haloperidol lactate, 10 mg, Intramuscular, Q4H PRN **AND** diphenhydrAMINE, 50 mg, Intramuscular, Q4H PRN **AND** lorazepam, 2 mg, Intramuscular, Q4H PRN    hydrOXYzine HCL, 50 mg, Oral, Q4H PRN    magnesium hydroxide 400 mg/5 ml, 30 mL, Oral, Daily PRN    ondansetron, 4 mg, Oral, Q6H PRN    traZODone, 100 mg, Oral, Nightly PRN   Psychotherapeutics (From admission, onward)      Start     Stop Route Frequency Ordered    10/28/24 1845  traZODone tablet 100 mg         -- Oral Nightly PRN 10/28/24 1845    10/28/24 1845  haloperidoL tablet 10 mg  (Med - Acute  Behavioral Management)        Placed in "And" Linked " "Group    -- Oral Every 4 hours PRN 10/28/24 1845    10/28/24 1845  LORazepam tablet 2 mg  (Med - Acute  Behavioral Management)        Placed in "And" Linked Group    -- Oral Every 4 hours PRN 10/28/24 1845    10/28/24 1845  haloperidol lactate injection 10 mg  (Med - Acute  Behavioral Management)        Placed in "And" Linked Group    -- IM Every 4 hours PRN 10/28/24 1845    10/28/24 1845  LORazepam injection 2 mg  (Med - Acute  Behavioral Management)        Placed in "And" Linked Group    -- IM Every 4 hours PRN 10/28/24 1845            Allergies:   Review of patient's allergies indicates:   Allergen Reactions    Zithromax z-ara [azithromycin] Nausea And Vomiting        OBJECTIVE:   Vitals   Vitals:    10/31/24 0701   BP: 90/60   Pulse: 97   Resp: 18   Temp: 97.7 °F (36.5 °C)        Labs/Imaging/Studies:   Recent Results (from the past 36 hours)   POCT glucose    Collection Time: 10/31/24  2:49 AM   Result Value Ref Range    POCT Glucose 128 (H) 70 - 110 mg/dL          Medical Review Of Systems:  Constitutional: negative  Respiratory: negative  Cardiovascular: negative  Gastrointestinal: negative  Genitourinary:negative  Musculoskeletal:negative  Neurological: negative       Psychiatric Mental Status Exam:  General Appearance: appears stated age, well-developed, well-nourished  Arousal: alert  Behavior: irritable  Movements and Motor Activity: no abnormal involuntary movements noted  Orientation: oriented to person, place, time, and situation  Speech: normal rate, normal rhythm, normal volume, normal tone  Mood: "Ok"  Affect: constricted  Thought Process: linear  Associations: intact  Thought Content and Perceptions: no suicidal ideation, no homicidal ideation, no auditory hallucinations, no visual hallucinations, no paranoid ideation, no ideas of reference  Recent and Remote Memory: recent memory intact, remote memory intact; per interview/observation with patient  Attention and Concentration: intact, attentive to " conversation; per interview/observation with patient  Fund of Knowledge: intact, aware of current events, vocabulary appropriate; based on history, vocabulary, fund of knowledge, syntax, grammar, and content  Insight: questionable; based on understanding of severity of illness and HPI  Judgment: questionable; based on patient's behavior and HPI     ASSESSMENT/PLAN:   Problems Addressed/Diagnoses:  Unspecified Mood Disorder (F39)  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

## 2024-10-31 NOTE — PROGRESS NOTES
Refused despite encouragement, Alternative materials were offered and refused.    10/31/24 1000   [facility] Group Therapy   Group Name Therapeutic Recreation   Specific Interventions Skilled Activity Mild Exercises   Participation Level None   Participation Quality Refused

## 2024-10-31 NOTE — NURSING
0230 -  states that patient fell in hallway. Pt observed sitting on the floor in the hallway leaning against the wall.Pt states that she fell because she was dizzy. Pt noted to be pale in color. BP 88/54 HR 95 SpO2 96%. Fluids given, 2 cups water and Powerade.  Pt tolerated well. No loss of consciousness noted. Pt .  Pt denies hitting her head or injury. Pt received Buprenorphine 8 mg and Trazodone 100 mg at 2030.   0240- Pt remains sitting on the floor in the hallway, c/o nausea. Cool towel placed to forehead. Color returning to face. BP 78/41   HR 88 SPO2- 97%.   0245- Pt states that she is starting to feel better. BP 96/65 HR 85. Assisted to a standing position and escorted back to bed. Encouraged pt not to get up without assistance and to utilize the call button. Pt verbalizes understanding.

## 2024-10-31 NOTE — PLAN OF CARE
Problem: Adult Behavioral Health Plan of Care  Goal: Plan of Care Review  Outcome: Progressing  Flowsheets (Taken 10/31/2024 1123)  Patient Agreement with Plan of Care: agrees  Plan of Care Reviewed With: patient  Goal: Patient-Specific Goal (Individualization)  Outcome: Progressing  Flowsheets (Taken 10/31/2024 1123)  Patient Personal Strengths: independent living skills  Patient Vulnerabilities: substance abuse/addiction  Goal: Adheres to Safety Considerations for Self and Others  Outcome: Progressing  Flowsheets (Taken 10/31/2024 1123)  Adheres to Safety Considerations for Self and Others: making progress toward outcome  Intervention: Develop and Maintain Individualized Safety Plan  Flowsheets (Taken 10/31/2024 1123)  Safety Measures: safety rounds completed  Goal: Absence of New-Onset Illness or Injury  Outcome: Progressing  Intervention: Identify and Manage Fall Risk  Flowsheets (Taken 10/31/2024 1123)  Safety Measures: safety rounds completed  Intervention: Prevent Infection  Flowsheets (Taken 10/31/2024 1123)  Infection Prevention: hand hygiene promoted  Goal: Optimized Coping Skills in Response to Life Stressors  Outcome: Progressing  Flowsheets (Taken 10/31/2024 1123)  Optimized Coping Skills in Response to Life Stressors: making progress toward outcome  Intervention: Promote Effective Coping Strategies  Flowsheets (Taken 10/31/2024 1123)  Supportive Measures: active listening utilized  Goal: Develops/Participates in Therapeutic [place] to Support Successful Transition  Outcome: Progressing  Flowsheets (Taken 10/31/2024 1123)  Develops/Participates in Therapeutic [place] to Support Successful Transition: making progress toward outcome  Intervention: Foster Therapeutic [place]  Flowsheets (Taken 10/31/2024 1123)  Trust Relationship/Rapport: care explained  Goal: Rounds/Family Conference  Outcome: Progressing  Flowsheets (Taken 10/31/2024 1123)  Participants:   milieu/psych techs   nursing     Problem:  Violence Risk or Actual  Goal: Anger and Impulse Control  Outcome: Progressing  Intervention: Minimize Safety Risk  Flowsheets (Taken 10/31/2024 1123)  Behavior Management: behavioral plan reviewed  Sensory Stimulation Regulation: quiet environment promoted  De-Escalation Techniques: quiet time facilitated  Intervention: Promote Self-Control  Flowsheets (Taken 10/31/2024 1123)  Supportive Measures: active listening utilized  Environmental Support: rest periods encouraged     Problem: Depressive Signs/Symptoms  Goal: Optimized Energy Level (Depressive Signs/Symptoms)  Outcome: Progressing  Flowsheets (Taken 10/31/2024 1123)  Mutually Determined Action Steps (Optimized Energy Level): grooms self without prompting  Intervention: Optimize Energy Level  Flowsheets (Taken 10/31/2024 1123)  Activity (Behavioral Health): up ad lior  Patient Performed Hygiene: teeth brushed  Diversional Activity: television  Goal: Optimized Cognitive Function (Depressive Signs/Symptoms)  Outcome: Progressing  Flowsheets (Taken 10/31/2024 1123)  Mutually Determined Action Steps (Optimized Cognitive Function): participates in attention training  Goal: Increased Participation and Engagement (Depressive Signs/Symptoms)  Outcome: Progressing  Flowsheets (Taken 10/31/2024 1123)  Mutually Determined Action Steps (Increased Participation and Engagement): participates in one or more activity  Intervention: Facilitate Participation and Engagement  Flowsheets (Taken 10/31/2024 1123)  Supportive Measures: active listening utilized  Diversional Activity: television  Goal: Enhanced Self-Esteem and Confidence (Depressive Signs/Symptoms)  Outcome: Progressing  Flowsheets (Taken 10/31/2024 1123)  Mutually Determined Action Steps (Enhanced Self-Esteem and Confidence): identifies judgmental thoughts  Intervention: Promote Confidence and Self-Esteem  Flowsheets (Taken 10/31/2024 1123)  Supportive Measures: active listening utilized  Goal: Improved Mood Symptoms  (Depressive Signs/Symptoms)  Outcome: Progressing  Flowsheets (Taken 10/31/2024 1123)  Mutually Determined Action Steps (Improved Mood Symptoms): acknowledges progress  Intervention: Promote Mood Improvement  Flowsheets (Taken 10/31/2024 1123)  Supportive Measures: active listening utilized  Diversional Activity: television  Goal: Optimized Nutrition Intake (Depressive Signs/Symptoms)  Outcome: Progressing  Flowsheets (Taken 10/31/2024 1123)  Mutually Determined Action Steps (Optimized Nutrition Intake): eats at meal time  Intervention: Promote Optimal Nutrition Intake  Flowsheets (Taken 10/31/2024 1123)  Nutrition Interventions: food preferences provided  Bowel Elimination Promotion: adequate fluid intake promoted  Goal: Improved Psychomotor Symptoms (Depressive Signs/Symptoms)  Outcome: Progressing  Flowsheets (Taken 10/31/2024 1123)  Mutually Determined Action Steps (Improved Psychomotor Symptoms): adheres to medication regimen  Intervention: Manage Psychomotor Movement  Flowsheets (Taken 10/31/2024 1123)  Activity (Behavioral Health): up ad lior  Patient Performed Hygiene: teeth brushed  Diversional Activity: television  Goal: Improved Sleep (Depressive Signs/Symptoms)  Outcome: Progressing  Flowsheets (Taken 10/31/2024 1123)  Mutually Determined Action Steps (Improved Sleep): sleeps 4-6 hours at night  Intervention: Promote Healthy Sleep Hygiene  Flowsheets (Taken 10/31/2024 1123)  Sleep Hygiene Promotion: regular sleep pattern promoted  Goal: Enhanced Social, Occupational or Functional Skills (Depressive Signs/Symptoms)  Outcome: Progressing  Flowsheets (Taken 10/31/2024 1123)  Mutually Determined Action Steps (Enhanced Social, Occupational or Functional Skills): identifies support resources  Intervention: Promote Social, Occupational and Functional Ability  Flowsheets (Taken 10/31/2024 1123)  Social Functional Ability Promotion: self-expression encouraged     Problem: Psychotic Signs/Symptoms  Goal: Improved  Behavioral Control (Psychotic Signs/Symptoms)  Outcome: Progressing  Flowsheets (Taken 10/31/2024 1123)  Mutually Determined Action Steps (Improved Behavioral Control): identifies symptoms triggers  Intervention: Manage Behavior  Flowsheets (Taken 10/31/2024 1123)  De-Escalation Techniques: quiet time facilitated  Goal: Optimal Cognitive Function (Psychotic Signs/Symptoms)  Outcome: Progressing  Flowsheets (Taken 10/31/2024 1123)  Mutually Determined Action Steps (Optimal Cognitive Function): participates in attention training  Intervention: Support and Promote Cognitive Ability  Flowsheets (Taken 10/31/2024 1123)  Trust Relationship/Rapport: care explained  Goal: Increased Participation and Engagement (Psychotic Signs/Symptoms)  Outcome: Progressing  Flowsheets (Taken 10/31/2024 1123)  Mutually Determined Action Steps (Increased Participation and Engagement): identifies symptoms triggers  Intervention: Facilitate Participation and Engagement  Flowsheets (Taken 10/31/2024 1123)  Supportive Measures: active listening utilized  Diversional Activity: television  Goal: Improved Mood Symptoms (Psychotic Signs/Symptoms)  Outcome: Progressing  Flowsheets (Taken 10/31/2024 1123)  Mutually Determined Action Steps (Improved Mood Symptoms): acknowledges progress  Intervention: Optimize Emotion and Mood  Flowsheets (Taken 10/31/2024 1123)  Supportive Measures: active listening utilized  Diversional Activity: television  Goal: Improved Psychomotor Symptoms (Psychotic Signs/Symptoms)  Outcome: Progressing  Flowsheets (Taken 10/31/2024 1123)  Mutually Determined Action Steps (Improved Psychomotor Symptoms): adheres to medication regimen  Intervention: Manage Psychomotor Movement  Flowsheets (Taken 10/31/2024 1123)  Activity (Behavioral Health): up ad lior  Patient Performed Hygiene: teeth brushed  Diversional Activity: television  Goal: Decreased Sensory Symptoms (Psychotic Signs/Symptoms)  Outcome: Progressing  Flowsheets (Taken  10/31/2024 1123)  Mutually Determined Action Steps (Decreased Sensory Symptoms): adheres to medication regimen  Intervention: Minimize and Manage Sensory Impairment  Flowsheets (Taken 10/31/2024 1123)  Sensory Stimulation Regulation: quiet environment promoted  Goal: Improved Sleep (Psychotic Signs/Symptoms)  Outcome: Progressing  Flowsheets (Taken 10/31/2024 1123)  Mutually Determined Action Steps (Improved Sleep): sleeps 4-6 hours at night  Intervention: Promote Healthy Sleep Hygiene  Flowsheets (Taken 10/31/2024 1123)  Sleep Hygiene Promotion: regular sleep pattern promoted  Goal: Enhanced Social, Occupational or Functional Skills (Psychotic Signs/Symptoms)  Outcome: Progressing  Flowsheets (Taken 10/31/2024 1123)  Mutually Determined Action Steps (Enhanced Social, Occupational or Functional Skills): identifies support resources  Intervention: Promote Social, Occupational and Functional Ability  Flowsheets (Taken 10/31/2024 1123)  Trust Relationship/Rapport: care explained  Social Functional Ability Promotion: self-expression encouraged    She is AAO X 4. Flat affect and blunted mood. Anxious. Angry because she isn't being discharged today. Lacks and insight into her illness. Good eye contact noted. Speech normal tone and speed. Interacts with selected patients and staff. Continue plan of care and provide a safe and therapeutic environment. Continue to monitor every fifteen minutes for safety.

## 2024-10-31 NOTE — NURSING
Patient is being monitored today fro blood pressure reported per night shift that it was on the low side after reports of not feeling well. BP at 701 is 90/60 manually taken by this  RN .During 1115 am Blood pressure reading of 106/67. Patient eat breakfast and was given increase hydration using Body Armor supplement 355 ml  total  tolerated well .

## 2024-11-01 PROCEDURE — S4991 NICOTINE PATCH NONLEGEND: HCPCS | Performed by: PSYCHIATRY & NEUROLOGY

## 2024-11-01 PROCEDURE — 25000003 PHARM REV CODE 250: Performed by: PSYCHIATRY & NEUROLOGY

## 2024-11-01 PROCEDURE — 12400001 HC PSYCH SEMI-PRIVATE ROOM

## 2024-11-01 PROCEDURE — 25000003 PHARM REV CODE 250: Performed by: INTERNAL MEDICINE

## 2024-11-01 RX ORDER — CEPHALEXIN 250 MG/1
250 CAPSULE ORAL 3 TIMES DAILY
Qty: 6 CAPSULE | Refills: 0 | Status: SHIPPED | OUTPATIENT
Start: 2024-11-01 | End: 2024-11-03

## 2024-11-01 RX ORDER — IBUPROFEN 200 MG
1 TABLET ORAL DAILY
Qty: 28 PATCH | Refills: 0 | Status: SHIPPED | OUTPATIENT
Start: 2024-11-02 | End: 2024-11-30

## 2024-11-01 RX ADMIN — CEPHALEXIN 250 MG: 250 CAPSULE ORAL at 09:11

## 2024-11-01 RX ADMIN — BUPRENORPHINE 8 MG: 8 TABLET SUBLINGUAL at 08:11

## 2024-11-01 RX ADMIN — CEPHALEXIN 250 MG: 250 CAPSULE ORAL at 03:11

## 2024-11-01 RX ADMIN — CEPHALEXIN 250 MG: 250 CAPSULE ORAL at 08:11

## 2024-11-01 RX ADMIN — BUPRENORPHINE 8 MG: 8 TABLET SUBLINGUAL at 09:11

## 2024-11-01 RX ADMIN — ONDANSETRON 4 MG: 4 TABLET, ORALLY DISINTEGRATING ORAL at 08:11

## 2024-11-01 RX ADMIN — NICOTINE 1 PATCH: 21 PATCH, EXTENDED RELEASE TRANSDERMAL at 09:11

## 2024-11-01 NOTE — PLAN OF CARE
Problem: Adult Behavioral Health Plan of Care  Goal: Plan of Care Review  Outcome: Progressing  Flowsheets (Taken 11/1/2024 1302)  Patient Agreement with Plan of Care: agrees  Plan of Care Reviewed With: patient  Goal: Patient-Specific Goal (Individualization)  Outcome: Progressing  Flowsheets (Taken 11/1/2024 1302)  Patient Personal Strengths: independent living skills  Patient Vulnerabilities: substance abuse/addiction  Goal: Adheres to Safety Considerations for Self and Others  Outcome: Progressing  Flowsheets (Taken 11/1/2024 1302)  Adheres to Safety Considerations for Self and Others: making progress toward outcome  Intervention: Develop and Maintain Individualized Safety Plan  Flowsheets (Taken 11/1/2024 1302)  Safety Measures: safety rounds completed  Goal: Absence of New-Onset Illness or Injury  Outcome: Progressing  Intervention: Identify and Manage Fall Risk  Flowsheets (Taken 11/1/2024 1302)  Safety Measures: safety rounds completed  Intervention: Prevent VTE (Venous Thromboembolism)  Flowsheets (Taken 11/1/2024 1302)  VTE Prevention/Management: fluids promoted  Intervention: Prevent Infection  Flowsheets (Taken 11/1/2024 1302)  Infection Prevention: hand hygiene promoted  Goal: Optimized Coping Skills in Response to Life Stressors  Outcome: Progressing  Flowsheets (Taken 11/1/2024 1302)  Optimized Coping Skills in Response to Life Stressors: making progress toward outcome  Intervention: Promote Effective Coping Strategies  Flowsheets (Taken 11/1/2024 1302)  Supportive Measures: self-care encouraged  Goal: Develops/Participates in Therapeutic [place] to Support Successful Transition  Outcome: Progressing  Flowsheets (Taken 11/1/2024 1302)  Develops/Participates in Therapeutic [place] to Support Successful Transition: making progress toward outcome  Intervention: Foster Therapeutic [place]  Flowsheets (Taken 11/1/2024 1302)  Trust Relationship/Rapport: care explained  Goal: Rounds/Family  Conference  Outcome: Progressing  Flowsheets (Taken 11/1/2024 1302)  Participants:   milieu/psych techs   nursing     Problem: Violence Risk or Actual  Goal: Anger and Impulse Control  Outcome: Progressing  Intervention: Minimize Safety Risk  Flowsheets (Taken 11/1/2024 1302)  Behavior Management: behavioral plan reviewed  Sensory Stimulation Regulation: quiet environment promoted  De-Escalation Techniques: quiet time facilitated  Intervention: Promote Self-Control  Flowsheets (Taken 11/1/2024 1302)  Supportive Measures: self-care encouraged  Environmental Support: calm environment promoted     Problem: Depressive Signs/Symptoms  Goal: Optimized Energy Level (Depressive Signs/Symptoms)  Outcome: Progressing  Flowsheets (Taken 11/1/2024 1302)  Mutually Determined Action Steps (Optimized Energy Level): grooms self without prompting  Intervention: Optimize Energy Level  Flowsheets (Taken 11/1/2024 1302)  Activity (Behavioral Health): up ad lior  Patient Performed Hygiene: teeth brushed  Diversional Activity: television  Goal: Optimized Cognitive Function (Depressive Signs/Symptoms)  Outcome: Progressing  Flowsheets (Taken 11/1/2024 1302)  Mutually Determined Action Steps (Optimized Cognitive Function): participates in attention training  Goal: Increased Participation and Engagement (Depressive Signs/Symptoms)  Outcome: Progressing  Flowsheets (Taken 11/1/2024 1302)  Mutually Determined Action Steps (Increased Participation and Engagement): participates in one or more activity  Intervention: Facilitate Participation and Engagement  Flowsheets (Taken 11/1/2024 1302)  Supportive Measures: self-care encouraged  Diversional Activity: television  Goal: Enhanced Self-Esteem and Confidence (Depressive Signs/Symptoms)  Outcome: Progressing  Flowsheets (Taken 11/1/2024 1302)  Mutually Determined Action Steps (Enhanced Self-Esteem and Confidence): identifies judgmental thoughts  Intervention: Promote Confidence and  Self-Esteem  Flowsheets (Taken 11/1/2024 1302)  Supportive Measures: self-care encouraged  Goal: Improved Mood Symptoms (Depressive Signs/Symptoms)  Outcome: Progressing  Flowsheets (Taken 11/1/2024 1302)  Mutually Determined Action Steps (Improved Mood Symptoms): acknowledges progress  Intervention: Promote Mood Improvement  Flowsheets (Taken 11/1/2024 1302)  Supportive Measures: self-care encouraged  Diversional Activity: television  Goal: Optimized Nutrition Intake (Depressive Signs/Symptoms)  Outcome: Progressing  Flowsheets (Taken 11/1/2024 1302)  Mutually Determined Action Steps (Optimized Nutrition Intake): eats at meal time  Intervention: Promote Optimal Nutrition Intake  Flowsheets (Taken 11/1/2024 1302)  Nutrition Interventions: food preferences provided  Bowel Elimination Promotion: adequate fluid intake promoted  Goal: Improved Psychomotor Symptoms (Depressive Signs/Symptoms)  Outcome: Progressing  Flowsheets (Taken 11/1/2024 1302)  Mutually Determined Action Steps (Improved Psychomotor Symptoms): adheres to medication regimen  Intervention: Manage Psychomotor Movement  Flowsheets (Taken 11/1/2024 1302)  Activity (Behavioral Health): up ad lior  Patient Performed Hygiene: teeth brushed  Diversional Activity: television  Goal: Improved Sleep (Depressive Signs/Symptoms)  Outcome: Progressing  Flowsheets (Taken 11/1/2024 1302)  Mutually Determined Action Steps (Improved Sleep): sleeps 4-6 hours at night  Intervention: Promote Healthy Sleep Hygiene  Flowsheets (Taken 11/1/2024 1302)  Sleep Hygiene Promotion: regular sleep pattern promoted  Goal: Enhanced Social, Occupational or Functional Skills (Depressive Signs/Symptoms)  Outcome: Progressing  Flowsheets (Taken 11/1/2024 1302)  Mutually Determined Action Steps (Enhanced Social, Occupational or Functional Skills): participates in social skills training  Intervention: Promote Social, Occupational and Functional Ability  Flowsheets (Taken 11/1/2024  1302)  Social Functional Ability Promotion: autonomy promoted     Problem: Psychotic Signs/Symptoms  Goal: Improved Behavioral Control (Psychotic Signs/Symptoms)  Outcome: Progressing  Flowsheets (Taken 11/1/2024 1302)  Mutually Determined Action Steps (Improved Behavioral Control): identifies symptoms triggers  Intervention: Manage Behavior  Flowsheets (Taken 11/1/2024 1302)  De-Escalation Techniques: quiet time facilitated  Goal: Optimal Cognitive Function (Psychotic Signs/Symptoms)  Outcome: Progressing  Flowsheets (Taken 11/1/2024 1302)  Mutually Determined Action Steps (Optimal Cognitive Function): participates in attention training  Intervention: Support and Promote Cognitive Ability  Flowsheets (Taken 11/1/2024 1302)  Trust Relationship/Rapport: care explained  Goal: Increased Participation and Engagement (Psychotic Signs/Symptoms)  Outcome: Progressing  Flowsheets (Taken 11/1/2024 1302)  Mutually Determined Action Steps (Increased Participation and Engagement): identifies symptoms triggers  Intervention: Facilitate Participation and Engagement  Flowsheets (Taken 11/1/2024 1302)  Supportive Measures: self-care encouraged  Diversional Activity: television  Goal: Improved Mood Symptoms (Psychotic Signs/Symptoms)  Outcome: Progressing  Flowsheets (Taken 11/1/2024 1302)  Mutually Determined Action Steps (Improved Mood Symptoms): acknowledges progress  Intervention: Optimize Emotion and Mood  Flowsheets (Taken 11/1/2024 1302)  Supportive Measures: self-care encouraged  Diversional Activity: television  Goal: Improved Psychomotor Symptoms (Psychotic Signs/Symptoms)  Outcome: Progressing  Flowsheets (Taken 11/1/2024 1302)  Mutually Determined Action Steps (Improved Psychomotor Symptoms): adheres to medication regimen  Intervention: Manage Psychomotor Movement  Flowsheets (Taken 11/1/2024 1302)  Activity (Behavioral Health): up ad lior  Patient Performed Hygiene: teeth brushed  Diversional Activity: television  Goal:  Decreased Sensory Symptoms (Psychotic Signs/Symptoms)  Outcome: Progressing  Flowsheets (Taken 11/1/2024 1302)  Mutually Determined Action Steps (Decreased Sensory Symptoms): adheres to medication regimen  Intervention: Minimize and Manage Sensory Impairment  Flowsheets (Taken 11/1/2024 1302)  Sensory Stimulation Regulation: quiet environment promoted  Goal: Improved Sleep (Psychotic Signs/Symptoms)  Outcome: Progressing  Flowsheets (Taken 11/1/2024 1302)  Mutually Determined Action Steps (Improved Sleep): sleeps 4-6 hours at night  Intervention: Promote Healthy Sleep Hygiene  Flowsheets (Taken 11/1/2024 1302)  Sleep Hygiene Promotion: regular sleep pattern promoted  Goal: Enhanced Social, Occupational or Functional Skills (Psychotic Signs/Symptoms)  Outcome: Progressing  Flowsheets (Taken 11/1/2024 1302)  Mutually Determined Action Steps (Enhanced Social, Occupational or Functional Skills): participates in social skills training  Intervention: Promote Social, Occupational and Functional Ability  Flowsheets (Taken 11/1/2024 1302)  Trust Relationship/Rapport: care explained  Social Functional Ability Promotion: autonomy promoted    She is AAO X 4. Flat affect and blunted mood. Denies SI, HI, hallucinations, and delusions. Isolated and withdrawn, but interacts with selected patients and staff. Good eye contact noted. Speech normal tone and speed. Continue plan of care and provide a safe and therapeutic environment. Continue to monitor every fifteen  minutes for safety.

## 2024-11-01 NOTE — PROGRESS NOTES
11/01/24 1000   [facility] Group Therapy   Group Name Therapeutic Recreation   Specific Interventions Skilled Activity Mild Exercises   Participation Level None   Participation Quality Refused  (Refused despite encouragement, Alternative materials were offered.)

## 2024-11-01 NOTE — PLAN OF CARE
Problem: Adult Behavioral Health Plan of Care  Goal: Plan of Care Review  Outcome: Progressing  Goal: Patient-Specific Goal (Individualization)  Outcome: Progressing  Flowsheets (Taken 11/1/2024 0139)  Patient Personal Strengths: medication/treatment adherence  Patient Vulnerabilities:   substance abuse/addiction   lacks insight into illness   history of unsuccessful treatment  Patient/Family-Specific Goals (Include Timeframe):   cessation of substance abuse   utilize coping skills  Goal: Adheres to Safety Considerations for Self and Others  Outcome: Progressing  Flowsheets (Taken 11/1/2024 0139)  Adheres to Safety Considerations for Self and Others: making progress toward outcome  Intervention: Develop and Maintain Individualized Safety Plan  Flowsheets (Taken 11/1/2024 0139)  Safety Measures: environmental rounds completed  Goal: Absence of New-Onset Illness or Injury  Outcome: Progressing  Goal: Optimized Coping Skills in Response to Life Stressors  Outcome: Progressing  Flowsheets (Taken 11/1/2024 0139)  Optimized Coping Skills in Response to Life Stressors: making progress toward outcome  Intervention: Promote Effective Coping Strategies  Flowsheets (Taken 11/1/2024 0139)  Supportive Measures: decision-making supported  Goal: Develops/Participates in Therapeutic [place] to Support Successful Transition  Outcome: Progressing  Goal: Rounds/Family Conference  Outcome: Progressing     Problem: Violence Risk or Actual  Goal: Anger and Impulse Control  Outcome: Progressing     Problem: Depressive Signs/Symptoms  Goal: Optimized Energy Level (Depressive Signs/Symptoms)  Outcome: Progressing  Goal: Optimized Cognitive Function (Depressive Signs/Symptoms)  Outcome: Progressing  Goal: Increased Participation and Engagement (Depressive Signs/Symptoms)  Outcome: Progressing  Goal: Enhanced Self-Esteem and Confidence (Depressive Signs/Symptoms)  Outcome: Progressing  Goal: Improved Mood Symptoms (Depressive  Signs/Symptoms)  Outcome: Progressing  Goal: Optimized Nutrition Intake (Depressive Signs/Symptoms)  Outcome: Progressing  Goal: Improved Psychomotor Symptoms (Depressive Signs/Symptoms)  Outcome: Progressing  Goal: Improved Sleep (Depressive Signs/Symptoms)  Outcome: Progressing  Goal: Enhanced Social, Occupational or Functional Skills (Depressive Signs/Symptoms)  Outcome: Progressing     Problem: Psychotic Signs/Symptoms  Goal: Improved Behavioral Control (Psychotic Signs/Symptoms)  Outcome: Progressing  Goal: Optimal Cognitive Function (Psychotic Signs/Symptoms)  Outcome: Progressing  Goal: Increased Participation and Engagement (Psychotic Signs/Symptoms)  Outcome: Progressing  Goal: Improved Mood Symptoms (Psychotic Signs/Symptoms)  Outcome: Progressing  Goal: Improved Psychomotor Symptoms (Psychotic Signs/Symptoms)  Outcome: Progressing  Goal: Decreased Sensory Symptoms (Psychotic Signs/Symptoms)  Outcome: Progressing  Goal: Improved Sleep (Psychotic Signs/Symptoms)  Outcome: Progressing  Goal: Enhanced Social, Occupational or Functional Skills (Psychotic Signs/Symptoms)  Outcome: Progressing    Patient in room, lying in bed with eyes open. Mood is withdrawn and irritable. Affect is blunted. Minimal disclosure noted. Appearance is unkept. Patient denies depression, denies SI/HI. Denies AVH. Patient is compliant with medications and minimally participating in treatment. Q15 safety checks in place. Will continue to provide a safe and therapeutic environment.

## 2024-11-01 NOTE — PROGRESS NOTES
Aromatherapy and Relaxation/meditation education group Co-facilitated with Daysi Rosales LPN   11/01/24 1400   [facility] Group Therapy   Group Name Therapeutic Recreation   Specific Interventions Relaxation Training   Participation Level Supportive;Appropriate;Attentive;Sharing   Participation Quality Cooperative   Insight/Motivation Limited;Applies New Skills   Affect/Mood Display Appropriate   Cognition Alert   Psychomotor WNL

## 2024-11-02 PROCEDURE — 12400001 HC PSYCH SEMI-PRIVATE ROOM

## 2024-11-02 PROCEDURE — S4991 NICOTINE PATCH NONLEGEND: HCPCS | Performed by: PSYCHIATRY & NEUROLOGY

## 2024-11-02 PROCEDURE — 25000003 PHARM REV CODE 250: Performed by: PSYCHIATRY & NEUROLOGY

## 2024-11-02 PROCEDURE — 25000003 PHARM REV CODE 250: Performed by: INTERNAL MEDICINE

## 2024-11-02 RX ADMIN — CEPHALEXIN 250 MG: 250 CAPSULE ORAL at 08:11

## 2024-11-02 RX ADMIN — BUPRENORPHINE 8 MG: 8 TABLET SUBLINGUAL at 08:11

## 2024-11-02 RX ADMIN — NICOTINE 1 PATCH: 21 PATCH, EXTENDED RELEASE TRANSDERMAL at 08:11

## 2024-11-02 RX ADMIN — CEPHALEXIN 250 MG: 250 CAPSULE ORAL at 04:11

## 2024-11-02 NOTE — PLAN OF CARE
Problem: Adult Behavioral Health Plan of Care  Goal: Plan of Care Review  Outcome: Progressing  Flowsheets (Taken 11/2/2024 0132)  Patient Agreement with Plan of Care: agrees  Plan of Care Reviewed With: patient  Goal: Patient-Specific Goal (Individualization)  Outcome: Progressing  Flowsheets (Taken 11/2/2024 0132)  Patient Personal Strengths: independent living skills  Patient Vulnerabilities: substance abuse/addiction  Goal: Adheres to Safety Considerations for Self and Others  Outcome: Progressing  Flowsheets (Taken 11/2/2024 0132)  Adheres to Safety Considerations for Self and Others: making progress toward outcome  Intervention: Develop and Maintain Individualized Safety Plan  Flowsheets (Taken 11/2/2024 0132)  Safety Measures:   safety rounds completed   monitored by video  Goal: Absence of New-Onset Illness or Injury  Outcome: Progressing  Intervention: Identify and Manage Fall Risk  Flowsheets (Taken 11/2/2024 0132)  Safety Measures:   safety rounds completed   monitored by video  Intervention: Prevent VTE (Venous Thromboembolism)  Flowsheets (Taken 11/2/2024 0132)  VTE Prevention/Management:   ambulation promoted   fluids promoted  Intervention: Prevent Infection  Flowsheets (Taken 11/2/2024 0132)  Infection Prevention:   hand hygiene promoted   rest/sleep promoted  Goal: Optimized Coping Skills in Response to Life Stressors  Outcome: Progressing  Flowsheets (Taken 11/2/2024 0132)  Optimized Coping Skills in Response to Life Stressors: making progress toward outcome  Intervention: Promote Effective Coping Strategies  Flowsheets (Taken 11/2/2024 0132)  Supportive Measures:   active listening utilized   verbalization of feelings encouraged  Goal: Develops/Participates in Therapeutic [place] to Support Successful Transition  Outcome: Progressing  Flowsheets (Taken 11/2/2024 0132)  Develops/Participates in Therapeutic [place] to Support Successful Transition: making progress toward outcome  Intervention:  Foster Therapeutic [place]  Flowsheets (Taken 11/2/2024 0132)  Trust Relationship/Rapport:   care explained   thoughts/feelings acknowledged  Intervention: Mutually Develop Transition Plan  Flowsheets (Taken 11/2/2024 0132)  Current Discharge Risk:   psychiatric illness   substance use/abuse  Outpatient/Agency/Support Group Needs:   outpatient counseling   outpatient medication management  Patient/Family Anticipated Services at Transition:   outpatient care   mental health services  Concerns to be Addressed:   medication   mental health   substance/tobacco abuse/use  Goal: Rounds/Family Conference  Outcome: Progressing  Flowsheets (Taken 11/2/2024 0132)  Participants:   milieu/psych techs   nursing     Problem: Violence Risk or Actual  Goal: Anger and Impulse Control  Outcome: Progressing  Intervention: Minimize Safety Risk  Flowsheets (Taken 11/2/2024 0132)  Behavior Management: behavioral plan reviewed  Sensory Stimulation Regulation: quiet environment promoted  De-Escalation Techniques:   medication administered   quiet time facilitated  Enhanced Safety Measures: monitored by video  Intervention: Promote Self-Control  Flowsheets (Taken 11/2/2024 0132)  Supportive Measures:   active listening utilized   verbalization of feelings encouraged  Environmental Support:   calm environment promoted   environmental consistency promoted   rest periods encouraged     Problem: Depressive Signs/Symptoms  Goal: Optimized Energy Level (Depressive Signs/Symptoms)  Outcome: Progressing  Flowsheets (Taken 11/2/2024 0132)  Mutually Determined Action Steps (Optimized Energy Level): grooms self without prompting  Intervention: Optimize Energy Level  Flowsheets (Taken 11/2/2024 0132)  Activity (Behavioral Health): up ad lior  Goal: Optimized Cognitive Function (Depressive Signs/Symptoms)  Outcome: Progressing  Goal: Increased Participation and Engagement (Depressive Signs/Symptoms)  Outcome: Progressing  Flowsheets (Taken 11/2/2024  0132)  Mutually Determined Action Steps (Increased Participation and Engagement): initiates interaction with others  Intervention: Facilitate Participation and Engagement  Flowsheets (Taken 11/2/2024 0132)  Supportive Measures:   active listening utilized   verbalization of feelings encouraged  Goal: Enhanced Self-Esteem and Confidence (Depressive Signs/Symptoms)  Outcome: Progressing  Intervention: Promote Confidence and Self-Esteem  Flowsheets (Taken 11/2/2024 0132)  Supportive Measures:   active listening utilized   verbalization of feelings encouraged  Goal: Improved Mood Symptoms (Depressive Signs/Symptoms)  Outcome: Progressing  Flowsheets (Taken 11/2/2024 0132)  Mutually Determined Action Steps (Improved Mood Symptoms): acknowledges progress  Intervention: Promote Mood Improvement  Flowsheets (Taken 11/2/2024 0132)  Supportive Measures:   active listening utilized   verbalization of feelings encouraged  Goal: Optimized Nutrition Intake (Depressive Signs/Symptoms)  Outcome: Progressing  Flowsheets (Taken 11/2/2024 0132)  Mutually Determined Action Steps (Optimized Nutrition Intake): eats at meal time  Intervention: Promote Optimal Nutrition Intake  Flowsheets (Taken 11/2/2024 0132)  Nutrition Interventions: frequent small meals provided  Bowel Elimination Promotion:   adequate fluid intake promoted   ambulation promoted  Goal: Improved Psychomotor Symptoms (Depressive Signs/Symptoms)  Outcome: Progressing  Flowsheets (Taken 11/2/2024 0132)  Mutually Determined Action Steps (Improved Psychomotor Symptoms): adheres to medication regimen  Intervention: Manage Psychomotor Movement  Flowsheets (Taken 11/2/2024 0132)  Activity (Behavioral Health): up ad lior  Goal: Improved Sleep (Depressive Signs/Symptoms)  Outcome: Progressing  Flowsheets (Taken 11/2/2024 0132)  Mutually Determined Action Steps (Improved Sleep): sleeps 4-6 hours at night  Intervention: Promote Healthy Sleep Hygiene  Flowsheets (Taken 11/2/2024  0153)  Sleep Hygiene Promotion:   awakenings minimized   regular sleep pattern promoted  Goal: Enhanced Social, Occupational or Functional Skills (Depressive Signs/Symptoms)  Outcome: Progressing  Intervention: Promote Social, Occupational and Functional Ability  Flowsheets (Taken 11/2/2024 0153)  Social Functional Ability Promotion:   autonomy promoted   self-expression encouraged     Problem: Psychotic Signs/Symptoms  Goal: Improved Behavioral Control (Psychotic Signs/Symptoms)  Outcome: Progressing  Intervention: Manage Behavior  Flowsheets (Taken 11/2/2024 0153)  De-Escalation Techniques: quiet time facilitated  Goal: Optimal Cognitive Function (Psychotic Signs/Symptoms)  Outcome: Progressing  Intervention: Support and Promote Cognitive Ability  Flowsheets (Taken 11/2/2024 0153)  Trust Relationship/Rapport:   care explained   thoughts/feelings acknowledged  Goal: Increased Participation and Engagement (Psychotic Signs/Symptoms)  Outcome: Progressing  Intervention: Facilitate Participation and Engagement  Flowsheets (Taken 11/2/2024 0153)  Supportive Measures:   active listening utilized   verbalization of feelings encouraged  Goal: Improved Mood Symptoms (Psychotic Signs/Symptoms)  Outcome: Progressing  Flowsheets (Taken 11/2/2024 0153)  Mutually Determined Action Steps (Improved Mood Symptoms): acknowledges progress  Intervention: Optimize Emotion and Mood  Flowsheets (Taken 11/2/2024 0153)  Supportive Measures:   active listening utilized   verbalization of feelings encouraged  Goal: Improved Psychomotor Symptoms (Psychotic Signs/Symptoms)  Outcome: Progressing  Flowsheets (Taken 11/2/2024 0153)  Mutually Determined Action Steps (Improved Psychomotor Symptoms): adheres to medication regimen  Intervention: Manage Psychomotor Movement  Flowsheets (Taken 11/2/2024 0153)  Activity (Behavioral Health): up ad lior  Goal: Decreased Sensory Symptoms (Psychotic Signs/Symptoms)  Outcome: Progressing  Flowsheets (Taken  11/2/2024 0153)  Mutually Determined Action Steps (Decreased Sensory Symptoms): adheres to medication regimen  Intervention: Minimize and Manage Sensory Impairment  Flowsheets (Taken 11/2/2024 0153)  Sensory Stimulation Regulation: quiet environment promoted  Goal: Improved Sleep (Psychotic Signs/Symptoms)  Outcome: Progressing  Flowsheets (Taken 11/2/2024 0153)  Mutually Determined Action Steps (Improved Sleep): sleeps 4-6 hours at night  Intervention: Promote Healthy Sleep Hygiene  Flowsheets (Taken 11/2/2024 0153)  Sleep Hygiene Promotion:   awakenings minimized   regular sleep pattern promoted  Goal: Enhanced Social, Occupational or Functional Skills (Psychotic Signs/Symptoms)  Outcome: Progressing  Intervention: Promote Social, Occupational and Functional Ability  Flowsheets (Taken 11/2/2024 0153)  Trust Relationship/Rapport:   care explained   thoughts/feelings acknowledged  Social Functional Ability Promotion:   autonomy promoted   self-expression encouraged   AAOx4. Flat affect. Anxious and depressed mood. Withdrawn and isolative. Minimal interaction with staff and peers. C/o feeling weak and tired. Also c/o nausea. COWS 3. Compliant with medications. Zofran 4 mg sl given for s/o nausea. Pt tolerated well. Denies suicidal ideations and hallucinations. Endorses feeling sad due to being away from kids. No agitation or aggression noted. Continue with plan of care and q 15 minute safety checks.

## 2024-11-02 NOTE — PLAN OF CARE
Problem: Adult Behavioral Health Plan of Care  Goal: Plan of Care Review  Outcome: Progressing  Flowsheets (Taken 11/2/2024 1056)  Patient Agreement with Plan of Care: agrees  Plan of Care Reviewed With: patient  Goal: Patient-Specific Goal (Individualization)  Outcome: Progressing  Flowsheets (Taken 11/2/2024 1056)  Patient Personal Strengths: independent living skills  Patient Vulnerabilities: substance abuse/addiction  Goal: Adheres to Safety Considerations for Self and Others  Outcome: Progressing  Flowsheets (Taken 11/2/2024 1056)  Adheres to Safety Considerations for Self and Others: making progress toward outcome  Intervention: Develop and Maintain Individualized Safety Plan  Flowsheets (Taken 11/2/2024 1056)  Safety Measures: safety rounds completed  Goal: Absence of New-Onset Illness or Injury  Outcome: Progressing  Intervention: Identify and Manage Fall Risk  Flowsheets (Taken 11/2/2024 1056)  Safety Measures: safety rounds completed  Intervention: Prevent VTE (Venous Thromboembolism)  Flowsheets (Taken 11/2/2024 1056)  VTE Prevention/Management: fluids promoted  Intervention: Prevent Infection  Flowsheets (Taken 11/2/2024 1056)  Infection Prevention: hand hygiene promoted  Goal: Optimized Coping Skills in Response to Life Stressors  Outcome: Progressing  Flowsheets (Taken 11/2/2024 1056)  Optimized Coping Skills in Response to Life Stressors: making progress toward outcome  Intervention: Promote Effective Coping Strategies  Flowsheets (Taken 11/2/2024 1056)  Supportive Measures: self-care encouraged  Goal: Develops/Participates in Therapeutic [place] to Support Successful Transition  Outcome: Progressing  Flowsheets (Taken 11/2/2024 1056)  Develops/Participates in Therapeutic [place] to Support Successful Transition: making progress toward outcome  Intervention: Foster Therapeutic [place]  Flowsheets (Taken 11/2/2024 1056)  Trust Relationship/Rapport: care explained  Goal: Rounds/Family  Conference  Outcome: Progressing  Flowsheets (Taken 11/2/2024 1056)  Participants:   milieu/psych techs   nursing     Problem: Violence Risk or Actual  Goal: Anger and Impulse Control  Outcome: Progressing  Intervention: Minimize Safety Risk  Flowsheets (Taken 11/2/2024 1056)  Behavior Management: behavioral plan reviewed  Sensory Stimulation Regulation: quiet environment promoted  De-Escalation Techniques: quiet time facilitated  Intervention: Promote Self-Control  Flowsheets (Taken 11/2/2024 1056)  Supportive Measures: self-care encouraged  Environmental Support: calm environment promoted     Problem: Depressive Signs/Symptoms  Goal: Optimized Energy Level (Depressive Signs/Symptoms)  Outcome: Progressing  Flowsheets (Taken 11/2/2024 1056)  Mutually Determined Action Steps (Optimized Energy Level): grooms self without prompting  Intervention: Optimize Energy Level  Flowsheets (Taken 11/2/2024 1056)  Activity (Behavioral Health): up ad lior  Patient Performed Hygiene: teeth brushed  Diversional Activity: television  Goal: Optimized Cognitive Function (Depressive Signs/Symptoms)  Outcome: Progressing  Flowsheets (Taken 11/2/2024 1056)  Mutually Determined Action Steps (Optimized Cognitive Function): participates in attention training  Goal: Increased Participation and Engagement (Depressive Signs/Symptoms)  Outcome: Progressing  Flowsheets (Taken 11/2/2024 1056)  Mutually Determined Action Steps (Increased Participation and Engagement): initiates interaction with others  Intervention: Facilitate Participation and Engagement  Flowsheets (Taken 11/2/2024 1056)  Supportive Measures: self-care encouraged  Diversional Activity: television  Goal: Enhanced Self-Esteem and Confidence (Depressive Signs/Symptoms)  Outcome: Progressing  Flowsheets (Taken 11/2/2024 1056)  Mutually Determined Action Steps (Enhanced Self-Esteem and Confidence): identifies judgmental thoughts  Intervention: Promote Confidence and  Self-Esteem  Flowsheets (Taken 11/2/2024 1056)  Supportive Measures: self-care encouraged  Goal: Improved Mood Symptoms (Depressive Signs/Symptoms)  Outcome: Progressing  Flowsheets (Taken 11/2/2024 1056)  Mutually Determined Action Steps (Improved Mood Symptoms): acknowledges progress  Intervention: Promote Mood Improvement  Flowsheets (Taken 11/2/2024 1056)  Supportive Measures: self-care encouraged  Diversional Activity: television  Goal: Optimized Nutrition Intake (Depressive Signs/Symptoms)  Outcome: Progressing  Flowsheets (Taken 11/2/2024 1056)  Mutually Determined Action Steps (Optimized Nutrition Intake): eats at meal time  Intervention: Promote Optimal Nutrition Intake  Flowsheets (Taken 11/2/2024 1056)  Nutrition Interventions: food preferences provided  Bowel Elimination Promotion: adequate fluid intake promoted  Goal: Improved Psychomotor Symptoms (Depressive Signs/Symptoms)  Outcome: Progressing  Flowsheets (Taken 11/2/2024 1056)  Mutually Determined Action Steps (Improved Psychomotor Symptoms): adheres to medication regimen  Intervention: Manage Psychomotor Movement  Flowsheets (Taken 11/2/2024 1056)  Activity (Behavioral Health): up ad lior  Patient Performed Hygiene: teeth brushed  Diversional Activity: television  Goal: Improved Sleep (Depressive Signs/Symptoms)  Outcome: Progressing  Flowsheets (Taken 11/2/2024 1056)  Mutually Determined Action Steps (Improved Sleep): sleeps 4-6 hours at night  Intervention: Promote Healthy Sleep Hygiene  Flowsheets (Taken 11/2/2024 1056)  Sleep Hygiene Promotion: regular sleep pattern promoted  Goal: Enhanced Social, Occupational or Functional Skills (Depressive Signs/Symptoms)  Outcome: Progressing  Flowsheets (Taken 11/2/2024 1056)  Mutually Determined Action Steps (Enhanced Social, Occupational or Functional Skills): participates in social skills training  Intervention: Promote Social, Occupational and Functional Ability  Flowsheets (Taken 11/2/2024  1056)  Social Functional Ability Promotion: autonomy promoted     Problem: Psychotic Signs/Symptoms  Goal: Improved Behavioral Control (Psychotic Signs/Symptoms)  Outcome: Progressing  Flowsheets (Taken 11/2/2024 1056)  Mutually Determined Action Steps (Improved Behavioral Control): identifies symptoms triggers  Intervention: Manage Behavior  Flowsheets (Taken 11/2/2024 1056)  De-Escalation Techniques: quiet time facilitated  Goal: Optimal Cognitive Function (Psychotic Signs/Symptoms)  Outcome: Progressing  Flowsheets (Taken 11/2/2024 1056)  Mutually Determined Action Steps (Optimal Cognitive Function): participates in attention training  Intervention: Support and Promote Cognitive Ability  Flowsheets (Taken 11/2/2024 1056)  Trust Relationship/Rapport: care explained  Goal: Increased Participation and Engagement (Psychotic Signs/Symptoms)  Outcome: Progressing  Flowsheets (Taken 11/2/2024 1056)  Mutually Determined Action Steps (Increased Participation and Engagement): identifies symptoms triggers  Intervention: Facilitate Participation and Engagement  Flowsheets (Taken 11/2/2024 1056)  Supportive Measures: self-care encouraged  Diversional Activity: television  Goal: Improved Mood Symptoms (Psychotic Signs/Symptoms)  Outcome: Progressing  Flowsheets (Taken 11/2/2024 1056)  Mutually Determined Action Steps (Improved Mood Symptoms): acknowledges progress  Intervention: Optimize Emotion and Mood  Flowsheets (Taken 11/2/2024 1056)  Supportive Measures: self-care encouraged  Diversional Activity: television  Goal: Improved Psychomotor Symptoms (Psychotic Signs/Symptoms)  Outcome: Progressing  Flowsheets (Taken 11/2/2024 1056)  Mutually Determined Action Steps (Improved Psychomotor Symptoms): adheres to medication regimen  Intervention: Manage Psychomotor Movement  Flowsheets (Taken 11/2/2024 1056)  Activity (Behavioral Health): up ad lior  Patient Performed Hygiene: teeth brushed  Diversional Activity: television  Goal:  Decreased Sensory Symptoms (Psychotic Signs/Symptoms)  Outcome: Progressing  Flowsheets (Taken 11/2/2024 1056)  Mutually Determined Action Steps (Decreased Sensory Symptoms): adheres to medication regimen  Intervention: Minimize and Manage Sensory Impairment  Flowsheets (Taken 11/2/2024 1056)  Sensory Stimulation Regulation: quiet environment promoted  Goal: Improved Sleep (Psychotic Signs/Symptoms)  Outcome: Progressing  Flowsheets (Taken 11/2/2024 1056)  Mutually Determined Action Steps (Improved Sleep): sleeps 4-6 hours at night  Intervention: Promote Healthy Sleep Hygiene  Flowsheets (Taken 11/2/2024 1056)  Sleep Hygiene Promotion: regular sleep pattern promoted  Goal: Enhanced Social, Occupational or Functional Skills (Psychotic Signs/Symptoms)  Outcome: Progressing  Flowsheets (Taken 11/2/2024 1056)  Mutually Determined Action Steps (Enhanced Social, Occupational or Functional Skills): participates in social skills training  Intervention: Promote Social, Occupational and Functional Ability  Flowsheets (Taken 11/2/2024 1056)  Trust Relationship/Rapport: care explained  Social Functional Ability Promotion: autonomy promoted    She is AAO X 4. Flat affect and blunted mood. Denies SI, HI, hallucinations, and delusions. Good eye contact noted. Speech normal tone and speed. Interacts with selected patients and staff. Continue plan of care and provide a safe and therapeutic environment.

## 2024-11-03 PROCEDURE — 25000003 PHARM REV CODE 250: Performed by: INTERNAL MEDICINE

## 2024-11-03 PROCEDURE — 25000003 PHARM REV CODE 250: Performed by: PSYCHIATRY & NEUROLOGY

## 2024-11-03 PROCEDURE — S4991 NICOTINE PATCH NONLEGEND: HCPCS | Performed by: PSYCHIATRY & NEUROLOGY

## 2024-11-03 PROCEDURE — 12400001 HC PSYCH SEMI-PRIVATE ROOM

## 2024-11-03 RX ADMIN — CEPHALEXIN 250 MG: 250 CAPSULE ORAL at 03:11

## 2024-11-03 RX ADMIN — CEPHALEXIN 250 MG: 250 CAPSULE ORAL at 08:11

## 2024-11-03 RX ADMIN — NICOTINE 1 PATCH: 21 PATCH, EXTENDED RELEASE TRANSDERMAL at 08:11

## 2024-11-03 RX ADMIN — BUPRENORPHINE 8 MG: 8 TABLET SUBLINGUAL at 08:11

## 2024-11-03 NOTE — PLAN OF CARE
Problem: Adult Behavioral Health Plan of Care  Goal: Plan of Care Review  Outcome: Progressing  Flowsheets (Taken 11/3/2024 1005)  Patient Agreement with Plan of Care: agrees  Plan of Care Reviewed With: patient  Goal: Patient-Specific Goal (Individualization)  Outcome: Progressing  Flowsheets (Taken 11/3/2024 1005)  Patient Personal Strengths: independent living skills  Patient Vulnerabilities: substance abuse/addiction  Goal: Adheres to Safety Considerations for Self and Others  Outcome: Progressing  Flowsheets (Taken 11/3/2024 1005)  Adheres to Safety Considerations for Self and Others: making progress toward outcome  Intervention: Develop and Maintain Individualized Safety Plan  Flowsheets (Taken 11/3/2024 1005)  Safety Measures: safety rounds completed  Goal: Absence of New-Onset Illness or Injury  Outcome: Progressing  Intervention: Identify and Manage Fall Risk  Flowsheets (Taken 11/3/2024 1005)  Safety Measures: safety rounds completed  Intervention: Prevent VTE (Venous Thromboembolism)  Flowsheets (Taken 11/3/2024 1005)  VTE Prevention/Management: fluids promoted  Intervention: Prevent Infection  Flowsheets (Taken 11/3/2024 1005)  Infection Prevention: rest/sleep promoted  Goal: Optimized Coping Skills in Response to Life Stressors  Outcome: Progressing  Flowsheets (Taken 11/3/2024 1005)  Optimized Coping Skills in Response to Life Stressors: making progress toward outcome  Intervention: Promote Effective Coping Strategies  Flowsheets (Taken 11/3/2024 1005)  Supportive Measures: self-care encouraged  Goal: Develops/Participates in Therapeutic [place] to Support Successful Transition  Outcome: Progressing  Flowsheets (Taken 11/3/2024 1005)  Develops/Participates in Therapeutic [place] to Support Successful Transition: making progress toward outcome  Intervention: Foster Therapeutic [place]  Flowsheets (Taken 11/3/2024 1005)  Trust Relationship/Rapport: care explained  Goal: Rounds/Family  Conference  Outcome: Progressing  Flowsheets (Taken 11/3/2024 1005)  Participants:   milieu/psych techs   nursing     Problem: Violence Risk or Actual  Goal: Anger and Impulse Control  Outcome: Progressing  Intervention: Minimize Safety Risk  Flowsheets (Taken 11/3/2024 1005)  Behavior Management: behavioral plan reviewed  Sensory Stimulation Regulation: quiet environment promoted  De-Escalation Techniques: quiet time facilitated  Intervention: Promote Self-Control  Flowsheets (Taken 11/3/2024 1005)  Supportive Measures: self-care encouraged  Environmental Support: calm environment promoted     Problem: Depressive Signs/Symptoms  Goal: Optimized Energy Level (Depressive Signs/Symptoms)  Outcome: Progressing  Flowsheets (Taken 11/3/2024 1005)  Mutually Determined Action Steps (Optimized Energy Level): grooms self without prompting  Intervention: Optimize Energy Level  Flowsheets (Taken 11/3/2024 1005)  Activity (Behavioral Health): up ad lior  Patient Performed Hygiene: teeth brushed  Diversional Activity: television  Goal: Optimized Cognitive Function (Depressive Signs/Symptoms)  Outcome: Progressing  Flowsheets (Taken 11/3/2024 1005)  Mutually Determined Action Steps (Optimized Cognitive Function): participates in attention training  Goal: Increased Participation and Engagement (Depressive Signs/Symptoms)  Outcome: Progressing  Flowsheets (Taken 11/3/2024 1005)  Mutually Determined Action Steps (Increased Participation and Engagement): participates in one or more activity  Intervention: Facilitate Participation and Engagement  Flowsheets (Taken 11/3/2024 1005)  Supportive Measures: self-care encouraged  Diversional Activity: television  Goal: Enhanced Self-Esteem and Confidence (Depressive Signs/Symptoms)  Outcome: Progressing  Flowsheets (Taken 11/3/2024 1005)  Mutually Determined Action Steps (Enhanced Self-Esteem and Confidence): identifies judgmental thoughts  Intervention: Promote Confidence and  Self-Esteem  Flowsheets (Taken 11/3/2024 1005)  Supportive Measures: self-care encouraged  Goal: Improved Mood Symptoms (Depressive Signs/Symptoms)  Outcome: Progressing  Flowsheets (Taken 11/3/2024 1005)  Mutually Determined Action Steps (Improved Mood Symptoms): acknowledges progress  Intervention: Promote Mood Improvement  Flowsheets (Taken 11/3/2024 1005)  Supportive Measures: self-care encouraged  Diversional Activity: television  Goal: Optimized Nutrition Intake (Depressive Signs/Symptoms)  Outcome: Progressing  Flowsheets (Taken 11/3/2024 1005)  Mutually Determined Action Steps (Optimized Nutrition Intake): eats at meal time  Intervention: Promote Optimal Nutrition Intake  Flowsheets (Taken 11/3/2024 1005)  Nutrition Interventions: food preferences provided  Bowel Elimination Promotion: ambulation promoted  Goal: Improved Psychomotor Symptoms (Depressive Signs/Symptoms)  Outcome: Progressing  Flowsheets (Taken 11/3/2024 1005)  Mutually Determined Action Steps (Improved Psychomotor Symptoms): adheres to medication regimen  Intervention: Manage Psychomotor Movement  Flowsheets (Taken 11/3/2024 1005)  Activity (Behavioral Health): up ad lior  Patient Performed Hygiene: teeth brushed  Diversional Activity: television  Goal: Improved Sleep (Depressive Signs/Symptoms)  Outcome: Progressing  Flowsheets (Taken 11/3/2024 1005)  Mutually Determined Action Steps (Improved Sleep): sleeps 4-6 hours at night  Intervention: Promote Healthy Sleep Hygiene  Flowsheets (Taken 11/3/2024 1005)  Sleep Hygiene Promotion: awakenings minimized  Goal: Enhanced Social, Occupational or Functional Skills (Depressive Signs/Symptoms)  Outcome: Progressing  Flowsheets (Taken 11/3/2024 1005)  Mutually Determined Action Steps (Enhanced Social, Occupational or Functional Skills): participates in social skills training  Intervention: Promote Social, Occupational and Functional Ability  Flowsheets (Taken 11/3/2024 1005)  Social Functional Ability  Promotion: autonomy promoted     Problem: Psychotic Signs/Symptoms  Goal: Improved Behavioral Control (Psychotic Signs/Symptoms)  Outcome: Progressing  Flowsheets (Taken 11/3/2024 1005)  Mutually Determined Action Steps (Improved Behavioral Control): identifies symptoms triggers  Intervention: Manage Behavior  Flowsheets (Taken 11/3/2024 1005)  De-Escalation Techniques: quiet time facilitated  Goal: Optimal Cognitive Function (Psychotic Signs/Symptoms)  Outcome: Progressing  Flowsheets (Taken 11/3/2024 1005)  Mutually Determined Action Steps (Optimal Cognitive Function): participates in attention training  Intervention: Support and Promote Cognitive Ability  Flowsheets (Taken 11/3/2024 1005)  Trust Relationship/Rapport: care explained  Goal: Increased Participation and Engagement (Psychotic Signs/Symptoms)  Outcome: Progressing  Flowsheets (Taken 11/3/2024 1005)  Mutually Determined Action Steps (Increased Participation and Engagement): identifies symptoms triggers  Intervention: Facilitate Participation and Engagement  Flowsheets (Taken 11/3/2024 1005)  Supportive Measures: self-care encouraged  Diversional Activity: television  Goal: Improved Mood Symptoms (Psychotic Signs/Symptoms)  Outcome: Progressing  Flowsheets (Taken 11/3/2024 1005)  Mutually Determined Action Steps (Improved Mood Symptoms): acknowledges progress  Intervention: Optimize Emotion and Mood  Flowsheets (Taken 11/3/2024 1005)  Supportive Measures: self-care encouraged  Diversional Activity: television  Goal: Improved Psychomotor Symptoms (Psychotic Signs/Symptoms)  Outcome: Progressing  Flowsheets (Taken 11/3/2024 1005)  Mutually Determined Action Steps (Improved Psychomotor Symptoms): adheres to medication regimen  Intervention: Manage Psychomotor Movement  Flowsheets (Taken 11/3/2024 1005)  Activity (Behavioral Health): up ad lior  Patient Performed Hygiene: teeth brushed  Diversional Activity: television  Goal: Decreased Sensory Symptoms  (Psychotic Signs/Symptoms)  Outcome: Progressing  Flowsheets (Taken 11/3/2024 1005)  Mutually Determined Action Steps (Decreased Sensory Symptoms): adheres to medication regimen  Intervention: Minimize and Manage Sensory Impairment  Flowsheets (Taken 11/3/2024 1005)  Sensory Stimulation Regulation: quiet environment promoted  Goal: Improved Sleep (Psychotic Signs/Symptoms)  Outcome: Progressing  Flowsheets (Taken 11/3/2024 1005)  Mutually Determined Action Steps (Improved Sleep): sleeps 4-6 hours at night  Intervention: Promote Healthy Sleep Hygiene  Flowsheets (Taken 11/3/2024 1005)  Sleep Hygiene Promotion: awakenings minimized  Goal: Enhanced Social, Occupational or Functional Skills (Psychotic Signs/Symptoms)  Outcome: Progressing  Flowsheets (Taken 11/3/2024 1005)  Mutually Determined Action Steps (Enhanced Social, Occupational or Functional Skills): participates in social skills training  Intervention: Promote Social, Occupational and Functional Ability  Flowsheets (Taken 11/3/2024 1005)  Trust Relationship/Rapport: care explained  Social Functional Ability Promotion: autonomy promoted    She is AAO X 4. Flat affect and blunted mood. Denies SI, HI, hallucinations, and delusions. Good eye contact noted. Speech normal tone and speed. Interacts with selected patients and staff. Continue to monitor every fifteen minutes for safety. Continue plan of care and provide a safe and therapeutic environment.

## 2024-11-03 NOTE — PLAN OF CARE
Problem: Adult Behavioral Health Plan of Care  Goal: Plan of Care Review  Outcome: Progressing  Flowsheets (Taken 11/3/2024 0100)  Patient Agreement with Plan of Care: agrees  Plan of Care Reviewed With: patient  Goal: Patient-Specific Goal (Individualization)  Outcome: Progressing  Flowsheets (Taken 11/3/2024 0100)  Patient Personal Strengths:   independent living skills   parenting skills  Patient Vulnerabilities: substance abuse/addiction  Goal: Adheres to Safety Considerations for Self and Others  Outcome: Progressing  Flowsheets (Taken 11/3/2024 0100)  Adheres to Safety Considerations for Self and Others: making progress toward outcome  Intervention: Develop and Maintain Individualized Safety Plan  Flowsheets (Taken 11/3/2024 0100)  Safety Measures:   monitored by video   safety rounds completed  Goal: Absence of New-Onset Illness or Injury  Outcome: Progressing  Intervention: Identify and Manage Fall Risk  Flowsheets (Taken 11/3/2024 0100)  Safety Measures:   monitored by video   safety rounds completed  Intervention: Prevent VTE (Venous Thromboembolism)  Flowsheets (Taken 11/3/2024 0100)  VTE Prevention/Management:   ambulation promoted   fluids promoted  Intervention: Prevent Infection  Flowsheets (Taken 11/3/2024 0100)  Infection Prevention:   rest/sleep promoted   hand hygiene promoted  Goal: Optimized Coping Skills in Response to Life Stressors  Outcome: Progressing  Flowsheets (Taken 11/3/2024 0100)  Optimized Coping Skills in Response to Life Stressors: making progress toward outcome  Intervention: Promote Effective Coping Strategies  Flowsheets (Taken 11/3/2024 0100)  Supportive Measures:   active listening utilized   self-care encouraged  Goal: Develops/Participates in Therapeutic [place] to Support Successful Transition  Outcome: Progressing  Flowsheets (Taken 11/3/2024 0100)  Develops/Participates in Therapeutic [place] to Support Successful Transition: making progress toward  outcome  Intervention: Foster Therapeutic [place]  Flowsheets (Taken 11/3/2024 0100)  Trust Relationship/Rapport:   care explained   thoughts/feelings acknowledged  Intervention: Mutually Develop Transition Plan  Flowsheets (Taken 11/3/2024 0100)  Current Discharge Risk:   psychiatric illness   substance use/abuse  Outpatient/Agency/Support Group Needs:   outpatient counseling   outpatient medication management  Anticipated Discharge Disposition: home or self-care  Patient/Family Anticipated Services at Transition:   outpatient care   mental health services  Patient/Family Anticipates Transition to: home  Concerns to be Addressed:   medication   mental health   substance/tobacco abuse/use  Goal: Rounds/Family Conference  Outcome: Progressing  Flowsheets (Taken 11/3/2024 0100)  Participants:   milieu/psych techs   nursing     Problem: Violence Risk or Actual  Goal: Anger and Impulse Control  Outcome: Progressing  Intervention: Minimize Safety Risk  Flowsheets (Taken 11/3/2024 0100)  Behavior Management: behavioral plan reviewed  Sensory Stimulation Regulation: quiet environment promoted  De-Escalation Techniques:   medication administered   quiet time facilitated  Enhanced Safety Measures: monitored by video  Intervention: Promote Self-Control  Flowsheets (Taken 11/3/2024 0100)  Supportive Measures:   active listening utilized   self-care encouraged  Environmental Support:   calm environment promoted   environmental consistency promoted   rest periods encouraged     Problem: Depressive Signs/Symptoms  Goal: Optimized Energy Level (Depressive Signs/Symptoms)  Outcome: Progressing  Flowsheets (Taken 11/3/2024 0100)  Mutually Determined Action Steps (Optimized Energy Level): grooms self without prompting  Intervention: Optimize Energy Level  Flowsheets (Taken 11/3/2024 0100)  Activity (Behavioral Health): up ad lior  Diversional Activity: television  Goal: Optimized Cognitive Function (Depressive  Signs/Symptoms)  Outcome: Progressing  Goal: Increased Participation and Engagement (Depressive Signs/Symptoms)  Outcome: Progressing  Flowsheets (Taken 11/3/2024 0100)  Mutually Determined Action Steps (Increased Participation and Engagement): initiates interaction with others  Intervention: Facilitate Participation and Engagement  Flowsheets (Taken 11/3/2024 0100)  Supportive Measures:   active listening utilized   self-care encouraged  Diversional Activity: television  Goal: Enhanced Self-Esteem and Confidence (Depressive Signs/Symptoms)  Outcome: Progressing  Intervention: Promote Confidence and Self-Esteem  Flowsheets (Taken 11/3/2024 0100)  Supportive Measures:   active listening utilized   self-care encouraged  Goal: Improved Mood Symptoms (Depressive Signs/Symptoms)  Outcome: Progressing  Flowsheets (Taken 11/3/2024 0100)  Mutually Determined Action Steps (Improved Mood Symptoms): acknowledges progress  Intervention: Promote Mood Improvement  Flowsheets (Taken 11/3/2024 0100)  Supportive Measures:   active listening utilized   self-care encouraged  Diversional Activity: television  Goal: Optimized Nutrition Intake (Depressive Signs/Symptoms)  Outcome: Progressing  Flowsheets (Taken 11/3/2024 0100)  Mutually Determined Action Steps (Optimized Nutrition Intake): eats at meal time  Intervention: Promote Optimal Nutrition Intake  Flowsheets (Taken 11/3/2024 0100)  Nutrition Interventions: food preferences provided  Bowel Elimination Promotion:   adequate fluid intake promoted   ambulation promoted  Goal: Improved Psychomotor Symptoms (Depressive Signs/Symptoms)  Outcome: Progressing  Flowsheets (Taken 11/3/2024 0100)  Mutually Determined Action Steps (Improved Psychomotor Symptoms):   adheres to medication regimen   exhibits decrease in agitation  Intervention: Manage Psychomotor Movement  Flowsheets (Taken 11/3/2024 0100)  Activity (Behavioral Health): up ad lior  Diversional Activity: television  Goal: Improved  Sleep (Depressive Signs/Symptoms)  Outcome: Progressing  Flowsheets (Taken 11/3/2024 0100)  Mutually Determined Action Steps (Improved Sleep): sleeps 4-6 hours at night  Intervention: Promote Healthy Sleep Hygiene  Flowsheets (Taken 11/3/2024 0100)  Sleep Hygiene Promotion:   awakenings minimized   regular sleep pattern promoted  Goal: Enhanced Social, Occupational or Functional Skills (Depressive Signs/Symptoms)  Outcome: Progressing  Intervention: Promote Social, Occupational and Functional Ability  Flowsheets (Taken 11/3/2024 0100)  Social Functional Ability Promotion:   autonomy promoted   self-expression encouraged     Problem: Psychotic Signs/Symptoms  Goal: Improved Behavioral Control (Psychotic Signs/Symptoms)  Outcome: Progressing  Intervention: Manage Behavior  Flowsheets (Taken 11/3/2024 0100)  De-Escalation Techniques:   medication administered   quiet time facilitated  Goal: Optimal Cognitive Function (Psychotic Signs/Symptoms)  Outcome: Progressing  Intervention: Support and Promote Cognitive Ability  Flowsheets (Taken 11/3/2024 0100)  Trust Relationship/Rapport:   care explained   thoughts/feelings acknowledged  Goal: Increased Participation and Engagement (Psychotic Signs/Symptoms)  Outcome: Progressing  Intervention: Facilitate Participation and Engagement  Flowsheets (Taken 11/3/2024 0100)  Supportive Measures:   active listening utilized   self-care encouraged  Diversional Activity: television  Goal: Improved Mood Symptoms (Psychotic Signs/Symptoms)  Outcome: Progressing  Flowsheets (Taken 11/3/2024 0100)  Mutually Determined Action Steps (Improved Mood Symptoms): acknowledges progress  Intervention: Optimize Emotion and Mood  Flowsheets (Taken 11/3/2024 0100)  Supportive Measures:   active listening utilized   self-care encouraged  Diversional Activity: television  Goal: Improved Psychomotor Symptoms (Psychotic Signs/Symptoms)  Outcome: Progressing  Flowsheets (Taken 11/3/2024 0100)  Mutually  Determined Action Steps (Improved Psychomotor Symptoms):   adheres to medication regimen   exhibits decrease in agitation  Intervention: Manage Psychomotor Movement  Flowsheets (Taken 11/3/2024 0100)  Activity (Behavioral Health): up ad lior  Diversional Activity: television  Goal: Decreased Sensory Symptoms (Psychotic Signs/Symptoms)  Outcome: Progressing  Flowsheets (Taken 11/3/2024 0100)  Mutually Determined Action Steps (Decreased Sensory Symptoms): adheres to medication regimen  Intervention: Minimize and Manage Sensory Impairment  Flowsheets (Taken 11/3/2024 0100)  Sensory Stimulation Regulation: quiet environment promoted  Goal: Improved Sleep (Psychotic Signs/Symptoms)  Outcome: Progressing  Flowsheets (Taken 11/3/2024 0100)  Mutually Determined Action Steps (Improved Sleep): sleeps 4-6 hours at night  Intervention: Promote Healthy Sleep Hygiene  Flowsheets (Taken 11/3/2024 0100)  Sleep Hygiene Promotion:   awakenings minimized   regular sleep pattern promoted  Goal: Enhanced Social, Occupational or Functional Skills (Psychotic Signs/Symptoms)  Outcome: Progressing  Intervention: Promote Social, Occupational and Functional Ability  Flowsheets (Taken 11/3/2024 0100)  Trust Relationship/Rapport:   care explained   thoughts/feelings acknowledged  Social Functional Ability Promotion:   autonomy promoted   self-expression encouraged    AAOx4. Sitting in the dayroom laughing and talking with peers. . Anxious mood. More interactive with staff and peers. Reports that she is feeling a lot better today. No detox symptoms noted. COWS 1. Compliant with medications. Pt tolerated well. No PRNs given Denies suicidal ideations and hallucinations. Endorses feeling sad due to being away from her kids. Reports sleeping and eating well. No agitation or aggression noted. Continue with plan of care and q 15 minute safety checks.

## 2024-11-03 NOTE — PROGRESS NOTES
11/03/24 1405   [facility] Group Therapy   Group Name Community Reintegration   Specific Interventions Resocialization   Participation Level None   Participation Quality Other (see comments)  (didn't attend)

## 2024-11-04 VITALS
HEIGHT: 63 IN | HEART RATE: 67 BPM | BODY MASS INDEX: 19.43 KG/M2 | DIASTOLIC BLOOD PRESSURE: 64 MMHG | OXYGEN SATURATION: 100 % | TEMPERATURE: 99 F | RESPIRATION RATE: 19 BRPM | WEIGHT: 109.63 LBS | SYSTOLIC BLOOD PRESSURE: 102 MMHG

## 2024-11-04 PROBLEM — F39 MODERATE MOOD DISORDER: Status: ACTIVE | Noted: 2024-11-04

## 2024-11-04 PROBLEM — F15.20 METHAMPHETAMINE ADDICTION: Status: ACTIVE | Noted: 2024-11-04

## 2024-11-04 PROCEDURE — 25000003 PHARM REV CODE 250: Performed by: INTERNAL MEDICINE

## 2024-11-04 PROCEDURE — 25000003 PHARM REV CODE 250: Performed by: PSYCHIATRY & NEUROLOGY

## 2024-11-04 RX ADMIN — CEPHALEXIN 250 MG: 250 CAPSULE ORAL at 08:11

## 2024-11-04 RX ADMIN — BUPRENORPHINE 8 MG: 8 TABLET SUBLINGUAL at 08:11

## 2024-11-04 NOTE — PLAN OF CARE
Christen did not mset reported treatment goals of EMR reflected patient expressed some interest in rehabilitation for meth use; however, she stated a preference to go home first to check on her children before entering rehab.  CTRS Discharge Recommendations:  Encouraged Pt. to actively utilize available community resources to increase leisure involvement to decrease signs and symptoms of illness.  Encouraged Pt. to utilize coping skills on a regular basis to reduce the risk of decomposition and re-hospitalization.

## 2024-11-04 NOTE — DISCHARGE SUMMARY
DISCHARGE SUMMARY  PSYCHIATRY      Admit Date: 10/28/2024  6:44 PM    Discharge Date:  11/4/2024    SITE:   [facility]    Discharge Attending Physician: Greg Marques M.D.    Chief Complaint:  Meth use     History of Present Illness On Admit:   Christen Hawthorne is a 28-year-old female placed under a PEC at [facility] after presenting with suicidal ideation following methamphetamine use.      Today, she is calm and cooperative, though she expresses uncertainty about why she is here. She reports that she became upset after using meth and has been using meth regularly for several years. She is currently under the care of Dr. Rivas, who prescribes Subutex and Vyvanse, which she believes are intended to support her cessation from meth use. Despite this, she continues to use meth daily alongside Vyvanse and Subutex. The patient expressed some interest in rehabilitation for meth use; however, she stated a preference to go home first to check on her children before entering rehab. Given this hesitation, there is a high likelihood she may not follow through on her own, so I will continue to educate her on the importance of pursuing rehab directly to support her recovery.     The patient denies any active suicidal or homicidal ideation and reports no current symptoms of depression, anxiety, auditory or visual hallucinations, paranoia, or delusions. She denies a history of mental health conditions and has not taken psychotropic medications in the past. Additionally, she states she has never been treated by a psychiatric provider.     We will continue her Subutex to prevent withdrawal symptoms and coordinate with staff regarding potential placement in a rehabilitation program.      She has a history of Subutex use at a dose of 20 mg once daily.     The patient will be admitted for medication management and safety monitoring.      Admit Mental Status Exam:  General Appearance:  appears stated age, well developed and nourished, adequately groomed and appropriately dressed, in no acute distress  Arousal: alert  Behavior: normal; cooperative; reasonably friendly, pleasant, and polite; appropriate eye-contact; under good behavioral control  Movements and Motor Activity: no abnormal involuntary movements noted; no tics, no tremors, no akathisia, no dystonia, no evidence of tardive dyskinesia; no psychomotor agitation or retardation  Orientation: oriented to person, place, time, and situation  Speech: increased latency of response  Mood: Dysphoric  Affect: constricted  Thought Process: intact, linear, goal-directed, organized, logical  Associations: intact, no loosening of associations  Thought Content and Perceptions: no suicidal or homicidal ideation, no auditory or visual hallucinations, no paranoid ideation, no ideas of reference, no evidence of delusions or psychosis  Recent and Remote Memory: grossly intact, able to recall relevant and salient information from the recent and remote past; per interview/observation with patient  Attention and Concentration: intact; per interview/observation with patient  Fund of Knowledge: intact; based on history, vocabulary, fund of knowledge, syntax, grammar, and content  Insight: questionable; based on understanding of severity of illness and HPI  Judgment: questionable; based on patient's behavior and HPI      Diagnoses:  PRINCIPAL PROBLEM:  Moderate mood disorder      PROBLEM LIST    Moderate mood disorder    Opioid type dependence, continuous    Methamphetamine addiction        Hospital Course:   Patient was admitted to [facility].      10/30/24  Odd affect at times per staff.  UDS was positive for amphetamines.  She is prescribed Vyvanse but does admit to methamphetamine use.     Patient states that she had been walking around and confused when the police picked her up.  Reports that she denied suicidal ideation but was taken to the ED regardless.  Advised  "her to take her medications this morning (had not taken them yet).  Staff working on placement.  Will continue current treatment plan and will monitor for the need to augment.         UDS: (+)amphetamines, fentanyl  Blood alcohol: <10      10/31/24  Staff reports that the patient continues to exhibit an odd affect. Patient states her mood is fine and endorses appropriate eating and sleeping patterns. She appears to be tolerating her medication regimen without issue. Currently, she is focused on discharge, citing her children as her reason; however, when questioned about her previous suicidal ideations, she became visibly irritated and left the room. Staff are working on placement arrangements. We will continue with the current treatment plan and monitor for any need to augment her care.       11/4/24  Due for discharge home today.  No acute complaints.  Tolerating current medication regimen without issues.  Denies thoughts of self-harm or harm to others.  No overt behavioral issues reported by staff.  Will proceed with discharge.      Current Medications:   Scheduled Meds:    buprenorphine HCL  8 mg Sublingual BID    cephALEXin  250 mg Oral TID    nicotine  1 patch Transdermal Daily          DISCHARGE EXAMINATION    VITALS   Vitals:    10/31/24 1601 11/01/24 0701 11/01/24 2145 11/02/24 0701   BP:    112/75   Pulse:    68   Resp:    18   Temp: Comment: patient refused Comment: refused  97.9 °F (36.6 °C)   TempSrc:       SpO2:    100%   Weight:   49.7 kg (109 lb 9.6 oz)    Height:             Discharge Mental Status Exam:  General Appearance: appears stated age, well-developed, well-nourished  Arousal: alert  Behavior: cooperative  Movements and Motor Activity: no abnormal involuntary movements noted  Orientation: oriented to person, place, time, and situation  Speech: normal rate, normal rhythm, normal volume, normal tone  Mood: "Good"  Affect: constricted  Thought Process: linear  Associations: intact  Thought " Content and Perceptions: no suicidal ideation, no homicidal ideation, no auditory hallucinations, no visual hallucinations, no paranoid ideation, no ideas of reference  Recent and Remote Memory: recent memory intact, remote memory intact; per interview/observation with patient  Attention and Concentration: intact, attentive to conversation; per interview/observation with patient  Fund of Knowledge: intact, aware of current events, vocabulary appropriate; based on history, vocabulary, fund of knowledge, syntax, grammar, and content  Insight: questionable; based on understanding of severity of illness and HPI  Judgment: questionable; based on patient's behavior and HPI       Discharge Condition:  Stable    Prognosis:  Fair    Justification for multiple antipsychotics:  N/a    Disposition:  discharged to home    Follow-up:   Follow-up Information       [facility].    Why: 11.11.2024 @9:30  Contact information:  [address] [place] [address]  [place] [address]  [phone]                             Medication Regimen:    Current Facility-Administered Medications:     acetaminophen tablet 650 mg, 650 mg, Oral, Q6H PRN, Greg Marques MD    aluminum-magnesium hydroxide-simethicone 200-200-20 mg/5 mL suspension 30 mL, 30 mL, Oral, Q6H PRN, Greg Marques MD    buprenorphine HCL SL tablet 8 mg, 8 mg, Sublingual, BID, Greg Marques MD, 8 mg at 11/03/24 2014    cephALEXin capsule 250 mg, 250 mg, Oral, TID, Bam Rios MD, 250 mg at 11/03/24 2014    haloperidoL tablet 10 mg, 10 mg, Oral, Q4H PRN **AND** diphenhydrAMINE capsule 50 mg, 50 mg, Oral, Q4H PRN **AND** LORazepam tablet 2 mg, 2 mg, Oral, Q4H PRN **AND** haloperidol lactate injection 10 mg, 10 mg, Intramuscular, Q4H PRN **AND** diphenhydrAMINE injection 50 mg, 50 mg, Intramuscular, Q4H PRN **AND** LORazepam injection 2 mg, 2 mg, Intramuscular, Q4H PRN, Greg Marques MD    hydrOXYzine tablet 50 mg, 50 mg, Oral,  Q4H PRN, Greg Marques MD    magnesium hydroxide 400 mg/5 ml suspension 2,400 mg, 30 mL, Oral, Daily PRN, Greg Marques MD    nicotine 21 mg/24 hr 1 patch, 1 patch, Transdermal, Daily, Greg Marques MD, 1 patch at 11/03/24 0848    ondansetron disintegrating tablet 4 mg, 4 mg, Oral, Q6H PRN, Greg Marques MD, 4 mg at 11/01/24 2013    traZODone tablet 100 mg, 100 mg, Oral, Nightly PRN, Greg Marques MD, 100 mg at 10/30/24 2030      Patient Instructions:   Continue medication regimen as prescribed.    Disposition plan per  - see  notes for details.    Patient instructed to call 911 or present to emergency department if any of the following complications develop status post discharge: suicidality, homicidality, or grave disability.     Total time spent discharging patient: <30 minutes      Greg Marques M.D.

## 2024-11-04 NOTE — NURSING
Discharge Note:    Christen Hawthorne is a 28 y.o. female, : 1995, MRN: [id number], admitted on 10/28/2024 for Greg Marques MD with a diagnosis of Depression [F32.A].    Patient discharged on 2024 per physician orders in stable condition. Patient denied suicidal ideation, homicidal ideation, or hallucinations. Patient was discharged with valuables, personal belongings, prescriptions, discharge instructions, and an educational handout explaining the diagnosis and prescribed medications. Patient verbalized understanding of the discharge instructions and importance of follow-up visits. Patient was escorted out of the facility by [facility] and placed into a private vehicle to be transported to home.     Patient discharged on the following medications:     Medication List        START taking these medications      nicotine 21 mg/24 hr  Commonly known as: NICODERM CQ  Place 1 patch onto the skin once daily.            STOP taking these medications      buprenorphine HCL 8 mg Subl  Commonly known as: SUBUTEX     cephALEXin 250 MG capsule  Commonly known as: KEFLEX     ibuprofen 800 MG tablet  Commonly known as: ADVIL,MOTRIN     lisdexamfetamine 50 MG capsule  Commonly known as: VYVANSE     megestroL 625 mg/5 mL (125 mg/mL) Susp  Commonly known as: MEGACE ES     VENTOLIN HFA 90 mcg/actuation inhaler  Generic drug: albuterol            ASK your doctor about these medications      cephALEXin 250 MG capsule  Commonly known as: KEFLEX  Take 1 capsule (250 mg total) by mouth 3 (three) times daily. for 2 days  Ask about: Should I take this medication?               Where to Get Your Medications        You can get these medications from any pharmacy    Bring a paper prescription for each of these medications  cephALEXin 250 MG capsule  nicotine 21 mg/24 hr

## 2024-11-04 NOTE — PLAN OF CARE
Problem: Adult Behavioral Health Plan of Care  Goal: Plan of Care Review  Outcome: Met  Goal: Patient-Specific Goal (Individualization)  Outcome: Met  Goal: Adheres to Safety Considerations for Self and Others  Outcome: Met  Goal: Absence of New-Onset Illness or Injury  Outcome: Met  Goal: Optimized Coping Skills in Response to Life Stressors  Outcome: Met  Goal: Develops/Participates in Therapeutic [place] to Support Successful Transition  Outcome: Met  Goal: Rounds/Family Conference  Outcome: Met     Problem: Violence Risk or Actual  Goal: Anger and Impulse Control  Outcome: Met     Problem: Depressive Signs/Symptoms  Goal: Optimized Energy Level (Depressive Signs/Symptoms)  Outcome: Met  Goal: Optimized Cognitive Function (Depressive Signs/Symptoms)  Outcome: Met  Goal: Increased Participation and Engagement (Depressive Signs/Symptoms)  Outcome: Met  Goal: Enhanced Self-Esteem and Confidence (Depressive Signs/Symptoms)  Outcome: Met  Goal: Improved Mood Symptoms (Depressive Signs/Symptoms)  Outcome: Met  Goal: Optimized Nutrition Intake (Depressive Signs/Symptoms)  Outcome: Met  Goal: Improved Psychomotor Symptoms (Depressive Signs/Symptoms)  Outcome: Met  Goal: Improved Sleep (Depressive Signs/Symptoms)  Outcome: Met  Goal: Enhanced Social, Occupational or Functional Skills (Depressive Signs/Symptoms)  Outcome: Met     Problem: Psychotic Signs/Symptoms  Goal: Improved Behavioral Control (Psychotic Signs/Symptoms)  Outcome: Met  Goal: Optimal Cognitive Function (Psychotic Signs/Symptoms)  Outcome: Met  Goal: Increased Participation and Engagement (Psychotic Signs/Symptoms)  Outcome: Met  Goal: Improved Mood Symptoms (Psychotic Signs/Symptoms)  Outcome: Met  Goal: Improved Psychomotor Symptoms (Psychotic Signs/Symptoms)  Outcome: Met  Goal: Decreased Sensory Symptoms (Psychotic Signs/Symptoms)  Outcome: Met  Goal: Improved Sleep (Psychotic Signs/Symptoms)  Outcome: Met  Goal: Enhanced Social, Occupational or  Functional Skills (Psychotic Signs/Symptoms)  Outcome: Met   AAOx4. Flat affect. Anxious and depressed mood. Denies suicidal ideations and hallucinations. Withdrawn and isolative. Minimal interaction with staff and peers. Did not attend group today. Compliant with medications. No detox symptoms reported. COWS 0. No PRNs given. Reports that she is eating and sleeping well. Answers questions appropriately. Thought process is clear.  No agitation or aggression noted at this time. Continue with plan of care and q 15 minute safety checks.

## 2024-11-12 ENCOUNTER — OFFICE VISIT (OUTPATIENT)
Dept: FAMILY MEDICINE | Facility: CLINIC | Age: 29
End: 2024-11-12
Payer: MEDICAID

## 2024-11-12 VITALS
HEART RATE: 95 BPM | TEMPERATURE: 98 F | DIASTOLIC BLOOD PRESSURE: 68 MMHG | OXYGEN SATURATION: 98 % | WEIGHT: 114.19 LBS | BODY MASS INDEX: 20.23 KG/M2 | HEIGHT: 63 IN | SYSTOLIC BLOOD PRESSURE: 106 MMHG

## 2024-11-12 DIAGNOSIS — F90.9 ATTENTION DEFICIT HYPERACTIVITY DISORDER (ADHD), UNSPECIFIED ADHD TYPE: ICD-10-CM

## 2024-11-12 DIAGNOSIS — F11.20 OPIOID TYPE DEPENDENCE, CONTINUOUS: Primary | ICD-10-CM

## 2024-11-12 DIAGNOSIS — F98.8 ATTENTION DEFICIT DISORDER (ADD) WITHOUT HYPERACTIVITY: ICD-10-CM

## 2024-11-12 PROCEDURE — 3074F SYST BP LT 130 MM HG: CPT | Mod: CPTII,,, | Performed by: FAMILY MEDICINE

## 2024-11-12 PROCEDURE — 99213 OFFICE O/P EST LOW 20 MIN: CPT | Mod: ,,, | Performed by: FAMILY MEDICINE

## 2024-11-12 PROCEDURE — 3078F DIAST BP <80 MM HG: CPT | Mod: CPTII,,, | Performed by: FAMILY MEDICINE

## 2024-11-12 PROCEDURE — 3044F HG A1C LEVEL LT 7.0%: CPT | Mod: CPTII,,, | Performed by: FAMILY MEDICINE

## 2024-11-12 PROCEDURE — 1159F MED LIST DOCD IN RCRD: CPT | Mod: CPTII,,, | Performed by: FAMILY MEDICINE

## 2024-11-12 PROCEDURE — 3008F BODY MASS INDEX DOCD: CPT | Mod: CPTII,,, | Performed by: FAMILY MEDICINE

## 2024-11-12 RX ORDER — MUPIROCIN 20 MG/G
OINTMENT TOPICAL 3 TIMES DAILY
COMMUNITY
Start: 2024-10-22

## 2024-11-12 RX ORDER — LISDEXAMFETAMINE DIMESYLATE 70 MG/1
70 CAPSULE ORAL EVERY MORNING
Qty: 30 CAPSULE | Refills: 0 | Status: SHIPPED | OUTPATIENT
Start: 2024-11-12 | End: 2024-12-12

## 2024-11-12 RX ORDER — LISDEXAMFETAMINE DIMESYLATE 70 MG/1
70 CAPSULE ORAL EVERY MORNING
Qty: 30 CAPSULE | Refills: 0 | Status: SHIPPED | OUTPATIENT
Start: 2024-11-12 | End: 2024-11-12

## 2024-11-12 NOTE — PROGRESS NOTES
SUBJECTIVE:  Christen Hawthorne is a 28 y.o. female here for Medication Adjustment      HPI  Patient is here for medication adjustment for her ADD.  The Vyvanse 50 mg has been helping but really does not quite last long and a for her full day at work.  She started taking 50 mg twice a day and noticed this helps a lot more.  She does have a history of amphetamine abuse in the past.  Christen's allergies, medications, history, and problem list were updated as appropriate.    Review of Systems   See HPI    Recent Results (from the past 3 weeks)   Comprehensive metabolic panel    Collection Time: 10/28/24 12:31 PM   Result Value Ref Range    Sodium 144 136 - 145 mmol/L    Potassium 4.6 3.5 - 5.1 mmol/L    Chloride 110 (H) 98 - 107 mmol/L    CO2 26 22 - 29 mmol/L    Glucose 109 (H) 74 - 100 mg/dL    Blood Urea Nitrogen 8.0 7.0 - 18.7 mg/dL    Creatinine 0.82 0.55 - 1.02 mg/dL    Calcium 9.8 8.4 - 10.2 mg/dL    Protein Total 7.6 6.4 - 8.3 gm/dL    Albumin 4.1 3.5 - 5.0 g/dL    Globulin 3.5 2.4 - 3.5 gm/dL    Albumin/Globulin Ratio 1.2 1.1 - 2.0 ratio    Bilirubin Total 0.7 <=1.5 mg/dL    ALP 94 40 - 150 unit/L    ALT 16 0 - 55 unit/L    AST 21 5 - 34 unit/L    eGFR >60 mL/min/1.73/m2    Anion Gap 8.0 mEq/L    BUN/Creatinine Ratio 10    TSH    Collection Time: 10/28/24 12:31 PM   Result Value Ref Range    TSH 1.708 0.350 - 4.940 uIU/mL   Ethanol    Collection Time: 10/28/24 12:31 PM   Result Value Ref Range    Ethanol Level <10.0 <=10.0 mg/dL   Acetaminophen level    Collection Time: 10/28/24 12:31 PM   Result Value Ref Range    Acetaminophen Level <3.0 (L) 10.0 - 30.0 ug/ml   CBC with Differential    Collection Time: 10/28/24 12:31 PM   Result Value Ref Range    WBC 7.83 4.50 - 11.50 x10(3)/mcL    RBC 4.47 4.20 - 5.40 x10(6)/mcL    Hgb 14.0 12.0 - 16.0 g/dL    Hct 41.7 37.0 - 47.0 %    MCV 93.3 80.0 - 94.0 fL    MCH 31.3 (H) 27.0 - 31.0 pg    MCHC 33.6 33.0 - 36.0 g/dL    RDW 11.6 11.5 - 17.0 %    Platelet 335 130 - 400  x10(3)/mcL    MPV 9.0 7.4 - 10.4 fL    Neut % 53.7 %    Lymph % 31.7 %    Mono % 10.3 %    Eos % 3.8 %    Basophil % 0.4 %    Lymph # 2.48 0.6 - 4.6 x10(3)/mcL    Neut # 4.20 2.1 - 9.2 x10(3)/mcL    Mono # 0.81 0.1 - 1.3 x10(3)/mcL    Eos # 0.30 0 - 0.9 x10(3)/mcL    Baso # 0.03 <=0.2 x10(3)/mcL    IG# 0.01 0 - 0.04 x10(3)/mcL    IG% 0.1 %   EKG 12-lead    Collection Time: 10/28/24 12:57 PM   Result Value Ref Range    QRS Duration 74 ms    [facility] QTC Calculation 432 ms   Pregnancy, urine rapid    Collection Time: 10/28/24  2:11 PM   Result Value Ref Range    hCG Qualitative, Urine Negative Negative   Urinalysis, Reflex to Urine Culture    Collection Time: 10/28/24  2:12 PM    Specimen: Urine   Result Value Ref Range    Color, UA Yellow Yellow, Light-Yellow, Dark Yellow, Fang, Straw    Appearance, UA Slightly Cloudy (A) Clear    Specific Gravity, UA 1.025 1.005 - 1.030    pH, UA 6.0 5.0 - 8.5    Protein, UA Negative Negative    Glucose, UA Negative Negative, Normal    Ketones, UA Trace (A) Negative    Blood, UA 1+ (A) Negative    Bilirubin, UA Negative Negative    Urobilinogen, UA 1.0 0.2, 1.0, Normal    Nitrites, UA Positive (A) Negative    Leukocyte Esterase, UA 1+ (A) Negative   Drug Screen panel, emergency    Collection Time: 10/28/24  2:12 PM   Result Value Ref Range    Amphetamines, Urine Positive (A) Negative    Barbiturates, Urine Negative Negative    Benzodiazepine, Urine Negative Negative    Cannabinoids, Urine Negative Negative    Cocaine, Urine Negative Negative    Fentanyl, Urine Positive (A) Negative    MDMA, Urine Negative Negative    Opiates, Urine Negative Negative    Phencyclidine, Urine Negative Negative    pH, Urine 6.0 3.0 - 11.0    Specific Gravity, Urine Auto 1.025 1.001 - 1.035   Urinalysis, Microscopic    Collection Time: 10/28/24  2:12 PM   Result Value Ref Range    Bacteria, UA Many (A) None Seen, Rare, Occasional /HPF    Mucous, UA Trace (A) None Seen /LPF    RBC, UA 0-5 None Seen, 0-2, 3-5,  0-5 /HPF    WBC, UA 21-50 (A) None Seen, 0-2, 3-5, 0-5 /HPF    Squamous Epithelial Cells, UA Few (A) None Seen, Rare, Occasional, Occ /HPF   Urine culture    Collection Time: 10/28/24  2:12 PM    Specimen: Urine   Result Value Ref Range    Urine Culture >/= 100,000 colonies/ml Escherichia coli (A)        Susceptibility    Escherichia coli -  (no method available)     Amox/K Clav 16 Intermediate      Ampicillin >=32 Resistant      Cefepime <=0.12 Sensitive      Ceftriaxone <=0.25 Sensitive      Cefuroxime 4 Sensitive      Ciprofloxacin <=0.25 Sensitive      Gentamicin >=16 Resistant      Levofloxacin <=0.12 Sensitive      Meropenem <=0.25 Sensitive      Nitrofurantoin 32 Sensitive      Piperacillin/Tazobactam <=4 Sensitive      Trimethoprim/Sulfamethoxazole >=320 Resistant      Tetracycline <=1 Sensitive      Tobramycin <=1 Sensitive    Hemoglobin A1C    Collection Time: 10/29/24  6:33 AM   Result Value Ref Range    Hemoglobin A1c 5.0 <=7.0 %    Estimated Average Glucose 96.8 mg/dL   SYPHILIS ANTIBODY (WITH REFLEX RPR)    Collection Time: 10/29/24  6:33 AM   Result Value Ref Range    Syphilis Antibody Nonreactive Nonreactive, Equivocal   TSH    Collection Time: 10/29/24  6:33 AM   Result Value Ref Range    TSH 2.478 0.350 - 4.940 uIU/mL   Lipid Panel    Collection Time: 10/29/24  6:33 AM   Result Value Ref Range    Cholesterol Total 142 <=200 mg/dL    HDL Cholesterol 38 35 - 60 mg/dL    Triglyceride 70 37 - 140 mg/dL    Cholesterol/HDL Ratio 4 0 - 5    Very Low Density Lipoprotein 14     LDL Cholesterol 90.00 50.00 - 140.00 mg/dL   Comprehensive Metabolic Panel    Collection Time: 10/29/24  6:33 AM   Result Value Ref Range    Sodium 139 136 - 145 mmol/L    Potassium 4.3 3.5 - 5.1 mmol/L    Chloride 109 (H) 98 - 107 mmol/L    CO2 23 22 - 29 mmol/L    Glucose 86 74 - 100 mg/dL    Blood Urea Nitrogen 11.6 7.0 - 18.7 mg/dL    Creatinine 0.73 0.55 - 1.02 mg/dL    Calcium 9.3 8.4 - 10.2 mg/dL    Protein Total 6.6 6.4 - 8.3  "gm/dL    Albumin 3.8 3.5 - 5.0 g/dL    Globulin 2.8 2.4 - 3.5 gm/dL    Albumin/Globulin Ratio 1.4 1.1 - 2.0 ratio    Bilirubin Total 0.5 <=1.5 mg/dL    ALP 83 40 - 150 unit/L    ALT 11 0 - 55 unit/L    AST 16 5 - 34 unit/L    eGFR >60 mL/min/1.73/m2    Anion Gap 7.0 mEq/L    BUN/Creatinine Ratio 16    CBC with Differential    Collection Time: 10/29/24  6:33 AM   Result Value Ref Range    WBC 6.17 4.50 - 11.50 x10(3)/mcL    RBC 4.32 4.20 - 5.40 x10(6)/mcL    Hgb 13.5 12.0 - 16.0 g/dL    Hct 40.5 37.0 - 47.0 %    MCV 93.8 80.0 - 94.0 fL    MCH 31.3 (H) 27.0 - 31.0 pg    MCHC 33.3 33.0 - 36.0 g/dL    RDW 11.6 11.5 - 17.0 %    Platelet 337 130 - 400 x10(3)/mcL    MPV 9.2 7.4 - 10.4 fL    Neut % 35.6 %    Lymph % 47.5 %    Mono % 11.0 %    Eos % 5.2 %    Basophil % 0.5 %    Lymph # 2.93 0.6 - 4.6 x10(3)/mcL    Neut # 2.20 2.1 - 9.2 x10(3)/mcL    Mono # 0.68 0.1 - 1.3 x10(3)/mcL    Eos # 0.32 0 - 0.9 x10(3)/mcL    Baso # 0.03 <=0.2 x10(3)/mcL    IG# 0.01 0 - 0.04 x10(3)/mcL    IG% 0.2 %    NRBC% 0.0 %   POCT glucose    Collection Time: 10/31/24  2:49 AM   Result Value Ref Range    POCT Glucose 128 (H) 70 - 110 mg/dL       OBJECTIVE:  Vital signs  Vitals:    11/12/24 1040   BP: 106/68   BP Location: Right arm   Patient Position: Sitting   Pulse: 95   Temp: 97.8 °F (36.6 °C)   TempSrc: Oral   SpO2: 98%   Weight: 51.8 kg (114 lb 3.2 oz)   Height: 5' 2.99" (1.6 m)        Physical Exam normal affect.  Heart with a regular rate and rhythm    ASSESSMENT/PLAN:  1. Opioid type dependence, continuous  Doing well on current dose buprenorphine    2. Attention deficit disorder (ADD) without hyperactivity  She is starting a new job and I agreed to going up to 70 mg on her Vyvanse daily.  If she feels like it does not last long enough she could open the capsule and take half in the morning and half in the afternoon    3. Attention deficit hyperactivity disorder (ADHD), unspecified ADHD type    -     lisdexamfetamine (VYVANSE) 70 MG " capsule; Take 1 capsule (70 mg total) by mouth every morning.  Dispense: 30 capsule; Refill: 0         Follow Up:  No follow-ups on file.  Keep regular follow-up in 2 months

## 2024-12-04 DIAGNOSIS — F90.9 ATTENTION DEFICIT HYPERACTIVITY DISORDER (ADHD), UNSPECIFIED ADHD TYPE: ICD-10-CM

## 2024-12-10 RX ORDER — LISDEXAMFETAMINE DIMESYLATE 70 MG/1
70 CAPSULE ORAL
Qty: 30 CAPSULE | Refills: 0 | Status: SHIPPED | OUTPATIENT
Start: 2024-12-10

## 2024-12-16 DIAGNOSIS — F11.20 UNCOMPLICATED OPIOID DEPENDENCE: Primary | ICD-10-CM

## 2024-12-16 RX ORDER — BUPRENORPHINE HYDROCHLORIDE 8 MG/1
16 TABLET SUBLINGUAL DAILY
Qty: 75 TABLET | Refills: 0 | Status: SHIPPED | OUTPATIENT
Start: 2024-12-16 | End: 2024-12-17

## 2024-12-17 RX ORDER — BUPRENORPHINE HYDROCHLORIDE 8 MG/1
16 TABLET SUBLINGUAL DAILY
Qty: 75 TABLET | Refills: 0 | Status: SHIPPED | OUTPATIENT
Start: 2024-12-17 | End: 2024-12-18

## 2024-12-18 RX ORDER — BUPRENORPHINE HYDROCHLORIDE 8 MG/1
20 TABLET SUBLINGUAL DAILY
Qty: 75 TABLET | Refills: 0 | Status: SHIPPED | OUTPATIENT
Start: 2024-12-18 | End: 2025-01-17

## 2025-01-14 ENCOUNTER — OFFICE VISIT (OUTPATIENT)
Dept: FAMILY MEDICINE | Facility: CLINIC | Age: 30
End: 2025-01-14
Payer: MEDICAID

## 2025-01-14 VITALS
OXYGEN SATURATION: 100 % | SYSTOLIC BLOOD PRESSURE: 104 MMHG | HEART RATE: 76 BPM | DIASTOLIC BLOOD PRESSURE: 68 MMHG | WEIGHT: 113.38 LBS | TEMPERATURE: 97 F | BODY MASS INDEX: 20.09 KG/M2 | HEIGHT: 63 IN

## 2025-01-14 DIAGNOSIS — F11.20 UNCOMPLICATED OPIOID DEPENDENCE: ICD-10-CM

## 2025-01-14 DIAGNOSIS — F98.8 ATTENTION DEFICIT DISORDER (ADD) WITHOUT HYPERACTIVITY: ICD-10-CM

## 2025-01-14 DIAGNOSIS — R82.998 LEUKOCYTES IN URINE: Primary | ICD-10-CM

## 2025-01-14 DIAGNOSIS — N30.00 ACUTE CYSTITIS WITHOUT HEMATURIA: ICD-10-CM

## 2025-01-14 DIAGNOSIS — F11.20 OPIOID DEPENDENCE, UNCOMPLICATED: ICD-10-CM

## 2025-01-14 DIAGNOSIS — R30.0 DYSURIA: ICD-10-CM

## 2025-01-14 DIAGNOSIS — F90.9 ATTENTION DEFICIT HYPERACTIVITY DISORDER (ADHD), UNSPECIFIED ADHD TYPE: ICD-10-CM

## 2025-01-14 DIAGNOSIS — Z11.4 SCREENING FOR HIV (HUMAN IMMUNODEFICIENCY VIRUS): ICD-10-CM

## 2025-01-14 LAB
BILIRUB SERPL-MCNC: NORMAL MG/DL
BLOOD URINE, POC: NORMAL
CLARITY, POC UA: NORMAL
COLOR, POC UA: NORMAL
GLUCOSE UR QL STRIP: NORMAL
KETONES UR QL STRIP: NORMAL
LEUKOCYTE ESTERASE URINE, POC: NORMAL
NITRITE, POC UA: NORMAL
PH, POC UA: 6.5
PROTEIN, POC: NORMAL
SPECIFIC GRAVITY, POC UA: 1.02
UROBILINOGEN, POC UA: 0.2

## 2025-01-14 PROCEDURE — 3008F BODY MASS INDEX DOCD: CPT | Mod: CPTII,,, | Performed by: FAMILY MEDICINE

## 2025-01-14 PROCEDURE — 3078F DIAST BP <80 MM HG: CPT | Mod: CPTII,,, | Performed by: FAMILY MEDICINE

## 2025-01-14 PROCEDURE — 87661 TRICHOMONAS VAGINALIS AMPLIF: CPT | Performed by: FAMILY MEDICINE

## 2025-01-14 PROCEDURE — 99214 OFFICE O/P EST MOD 30 MIN: CPT | Mod: ,,, | Performed by: FAMILY MEDICINE

## 2025-01-14 PROCEDURE — 81002 URINALYSIS NONAUTO W/O SCOPE: CPT | Mod: ,,, | Performed by: FAMILY MEDICINE

## 2025-01-14 PROCEDURE — 3074F SYST BP LT 130 MM HG: CPT | Mod: CPTII,,, | Performed by: FAMILY MEDICINE

## 2025-01-14 PROCEDURE — 87661 TRICHOMONAS VAGINALIS AMPLIF: CPT | Mod: 59 | Performed by: FAMILY MEDICINE

## 2025-01-14 PROCEDURE — 1159F MED LIST DOCD IN RCRD: CPT | Mod: CPTII,,, | Performed by: FAMILY MEDICINE

## 2025-01-14 PROCEDURE — 87086 URINE CULTURE/COLONY COUNT: CPT | Performed by: FAMILY MEDICINE

## 2025-01-14 PROCEDURE — 87389 HIV-1 AG W/HIV-1&-2 AB AG IA: CPT | Performed by: FAMILY MEDICINE

## 2025-01-14 PROCEDURE — 86803 HEPATITIS C AB TEST: CPT | Performed by: FAMILY MEDICINE

## 2025-01-14 PROCEDURE — 87491 CHLMYD TRACH DNA AMP PROBE: CPT | Performed by: FAMILY MEDICINE

## 2025-01-14 RX ORDER — NITROFURANTOIN 25; 75 MG/1; MG/1
100 CAPSULE ORAL 2 TIMES DAILY
Qty: 14 CAPSULE | Refills: 0 | Status: SHIPPED | OUTPATIENT
Start: 2025-01-14 | End: 2025-01-21

## 2025-01-14 RX ORDER — BUPRENORPHINE HYDROCHLORIDE 8 MG/1
20 TABLET SUBLINGUAL DAILY
Qty: 75 TABLET | Refills: 2 | Status: SHIPPED | OUTPATIENT
Start: 2025-01-14 | End: 2025-04-14

## 2025-01-14 RX ORDER — LISDEXAMFETAMINE DIMESYLATE 70 MG/1
70 CAPSULE ORAL EVERY MORNING
Qty: 30 CAPSULE | Refills: 0 | Status: SHIPPED | OUTPATIENT
Start: 2025-01-14 | End: 2025-02-13

## 2025-01-14 NOTE — PROGRESS NOTES
"SUBJECTIVE:  Christen Hawthorne is a 29 y.o. female here for Medication F/U      HPI  Patient here for follow-up on chronic conditions.  She is doing well at this time.  She has been having some cloudy urine and urinary frequency.  She took a round of Keflex but did not seem to help.  She is not having fever.  No back pain.  Christen's allergies, medications, history, and problem list were updated as appropriate.    Review of Systems   See HPI    No results found for this or any previous visit (from the past 3 weeks).    OBJECTIVE:  Vital signs  Vitals:    01/14/25 1132   BP: 104/68   BP Location: Right arm   Patient Position: Sitting   Pulse: 76   Temp: 96.6 °F (35.9 °C)   TempSrc: Oral   SpO2: 100%   Weight: 51.4 kg (113 lb 6.4 oz)   Height: 5' 2.99" (1.6 m)        Physical Exam non ill-appearing    ASSESSMENT/PLAN:  1. Dysuria  UA today with culture.  Start Macrobid empirically    2. Opioid dependence, uncomplicated    -     Hepatitis C Antibody; Future; Expected date: 01/14/2025    3. Attention deficit disorder (ADD) without hyperactivity    -     Hepatitis C Antibody; Future; Expected date: 01/14/2025    4. Uncomplicated opioid dependence  Doing well on present dose of Subutex 20 mg daily which will be continued  -     buprenorphine HCL (SUBUTEX) 8 mg Subl; Place 2.5 tablets (20 mg total) under the tongue once daily.  Dispense: 75 tablet; Refill: 2  -     Hepatitis C Antibody; Future; Expected date: 01/14/2025    5. Attention deficit hyperactivity disorder (ADHD), unspecified ADHD type  Refilled Vyvanse  -     lisdexamfetamine (VYVANSE) 70 MG capsule; Take 1 capsule (70 mg total) by mouth every morning.  Dispense: 30 capsule; Refill: 0  -     Hepatitis C Antibody; Future; Expected date: 01/14/2025    6. Acute cystitis without hematuria    025    7. Screening for HIV (human immunodeficiency virus)    -     HIV 1/2 Ag/Ab (4th Gen); Future; Expected date: 01/14/2025  -     Hepatitis C Antibody; Future; Expected date: " 01/14/2025    Other orders  -     nitrofurantoin, macrocrystal-monohydrate, (MACROBID) 100 MG capsule; Take 1 capsule (100 mg total) by mouth 2 (two) times daily. for 7 days  Dispense: 14 capsule; Refill: 0         Follow Up:  Follow up in about 3 months (around 4/14/2025).

## 2025-01-15 LAB
C TRACH RRNA SPEC QL NAA+PROBE: NEGATIVE
HCV AB SERPL QL IA: NONREACTIVE
HIV 1+2 AB+HIV1 P24 AG SERPL QL IA: NONREACTIVE
N GONORRHOEA RRNA SPEC QL NAA+PROBE: NEGATIVE
SPECIMEN SOURCE: NORMAL
T VAGINALIS RRNA SPEC QL NAA+PROBE: NEGATIVE
T VAGINALIS RRNA SPEC QL NAA+PROBE: NORMAL

## 2025-01-16 ENCOUNTER — PATIENT MESSAGE (OUTPATIENT)
Dept: FAMILY MEDICINE | Facility: CLINIC | Age: 30
End: 2025-01-16
Payer: MEDICAID

## 2025-01-17 LAB — BACTERIA UR CULT: ABNORMAL

## 2025-02-13 ENCOUNTER — TELEPHONE (OUTPATIENT)
Dept: FAMILY MEDICINE | Facility: CLINIC | Age: 30
End: 2025-02-13
Payer: MEDICAID

## 2025-02-13 DIAGNOSIS — F90.9 ATTENTION DEFICIT HYPERACTIVITY DISORDER (ADHD), UNSPECIFIED ADHD TYPE: ICD-10-CM

## 2025-02-13 RX ORDER — LISDEXAMFETAMINE DIMESYLATE 70 MG/1
70 CAPSULE ORAL EVERY MORNING
Qty: 30 CAPSULE | Refills: 0 | Status: SHIPPED | OUTPATIENT
Start: 2025-02-13 | End: 2025-03-15

## 2025-03-13 DIAGNOSIS — F90.9 ATTENTION DEFICIT HYPERACTIVITY DISORDER (ADHD), UNSPECIFIED ADHD TYPE: ICD-10-CM

## 2025-03-13 RX ORDER — LISDEXAMFETAMINE DIMESYLATE 70 MG/1
70 CAPSULE ORAL EVERY MORNING
Qty: 30 CAPSULE | Refills: 0 | Status: SHIPPED | OUTPATIENT
Start: 2025-03-13 | End: 2025-04-12

## 2025-04-16 ENCOUNTER — OFFICE VISIT (OUTPATIENT)
Dept: FAMILY MEDICINE | Facility: CLINIC | Age: 30
End: 2025-04-16
Payer: MEDICAID

## 2025-04-16 VITALS
TEMPERATURE: 98 F | HEIGHT: 63 IN | OXYGEN SATURATION: 99 % | DIASTOLIC BLOOD PRESSURE: 68 MMHG | SYSTOLIC BLOOD PRESSURE: 90 MMHG | WEIGHT: 112 LBS | BODY MASS INDEX: 19.84 KG/M2 | HEART RATE: 72 BPM

## 2025-04-16 DIAGNOSIS — F90.9 ATTENTION DEFICIT HYPERACTIVITY DISORDER (ADHD), UNSPECIFIED ADHD TYPE: ICD-10-CM

## 2025-04-16 DIAGNOSIS — F98.8 ATTENTION DEFICIT DISORDER (ADD) WITHOUT HYPERACTIVITY: ICD-10-CM

## 2025-04-16 DIAGNOSIS — F11.20 OPIOID DEPENDENCE, UNCOMPLICATED: Primary | ICD-10-CM

## 2025-04-16 PROCEDURE — 1159F MED LIST DOCD IN RCRD: CPT | Mod: CPTII,,, | Performed by: FAMILY MEDICINE

## 2025-04-16 PROCEDURE — 3008F BODY MASS INDEX DOCD: CPT | Mod: CPTII,,, | Performed by: FAMILY MEDICINE

## 2025-04-16 PROCEDURE — 3074F SYST BP LT 130 MM HG: CPT | Mod: CPTII,,, | Performed by: FAMILY MEDICINE

## 2025-04-16 PROCEDURE — 99213 OFFICE O/P EST LOW 20 MIN: CPT | Mod: ,,, | Performed by: FAMILY MEDICINE

## 2025-04-16 PROCEDURE — 3078F DIAST BP <80 MM HG: CPT | Mod: CPTII,,, | Performed by: FAMILY MEDICINE

## 2025-04-16 RX ORDER — BUPRENORPHINE HYDROCHLORIDE 8 MG/1
TABLET SUBLINGUAL
Qty: 75 EACH | Refills: 2 | Status: SHIPPED | OUTPATIENT
Start: 2025-04-16

## 2025-04-16 RX ORDER — BUPRENORPHINE HYDROCHLORIDE 8 MG/1
TABLET SUBLINGUAL
COMMUNITY
Start: 2025-03-21 | End: 2025-04-16 | Stop reason: SDUPTHER

## 2025-04-16 RX ORDER — LISDEXAMFETAMINE DIMESYLATE 70 MG/1
70 CAPSULE ORAL EVERY MORNING
Qty: 30 CAPSULE | Refills: 0 | Status: SHIPPED | OUTPATIENT
Start: 2025-04-16 | End: 2025-05-16

## 2025-04-16 NOTE — PROGRESS NOTES
"SUBJECTIVE:  Christen Hawthorne is a 29 y.o. female here for Medication Management (MAT)      HPI  Patient here for follow-up on opioid dependence.  Doing very well on Subutex 20 mg daily.  She is not having any cravings.  She has not used any amphetamines.  She is also on Vyvanse  Christen's allergies, medications, history, and problem list were updated as appropriate.    Review of Systems   See HPI.    No results found for this or any previous visit (from the past 3 weeks).    OBJECTIVE:  Vital signs  Vitals:    25 1508   BP: 90/68   BP Location: Left arm   Patient Position: Sitting   Pulse: 72   Temp: 98 °F (36.7 °C)   TempSrc: Oral   SpO2: 99%   Weight: 50.8 kg (112 lb)   Height: 5' 2.99" (1.6 m)        Physical Exam normal affect and speech    ASSESSMENT/PLAN:  1. Opioid dependence, uncomplicated  Doing very well on Subutex 20 mg daily and we would like to stay on this dose  -     buprenorphine HCL (SUBUTEX) 8 mg Subl; SMARTSI.5 Tablet(s) Sublingual Daily  Dispense: 75 each; Refill: 2    2. Attention deficit disorder (ADD) without hyperactivity  Continue Vyvanse 70 mg daily, she has not used any other amphetamines as she has in the past    3. Attention deficit hyperactivity disorder (ADHD), unspecified ADHD type    -     lisdexamfetamine (VYVANSE) 70 MG capsule; Take 1 capsule (70 mg total) by mouth every morning.  Dispense: 30 capsule; Refill: 0         Follow Up:  Follow up in about 3 months (around 2025).            "

## 2025-05-15 DIAGNOSIS — F90.9 ATTENTION DEFICIT HYPERACTIVITY DISORDER (ADHD), UNSPECIFIED ADHD TYPE: ICD-10-CM

## 2025-05-15 RX ORDER — LISDEXAMFETAMINE DIMESYLATE 70 MG/1
70 CAPSULE ORAL EVERY MORNING
Qty: 30 CAPSULE | Refills: 0 | Status: SHIPPED | OUTPATIENT
Start: 2025-05-15 | End: 2025-06-14

## 2025-06-16 DIAGNOSIS — F90.9 ATTENTION DEFICIT HYPERACTIVITY DISORDER (ADHD), UNSPECIFIED ADHD TYPE: ICD-10-CM

## 2025-06-16 RX ORDER — LISDEXAMFETAMINE DIMESYLATE 70 MG/1
70 CAPSULE ORAL EVERY MORNING
Qty: 30 CAPSULE | Refills: 0 | Status: SHIPPED | OUTPATIENT
Start: 2025-06-16 | End: 2025-07-16

## 2025-07-15 ENCOUNTER — OFFICE VISIT (OUTPATIENT)
Dept: FAMILY MEDICINE | Facility: CLINIC | Age: 30
End: 2025-07-15
Payer: MEDICAID

## 2025-07-15 VITALS
TEMPERATURE: 97 F | BODY MASS INDEX: 19.73 KG/M2 | SYSTOLIC BLOOD PRESSURE: 104 MMHG | DIASTOLIC BLOOD PRESSURE: 68 MMHG | HEART RATE: 54 BPM | WEIGHT: 111.38 LBS | OXYGEN SATURATION: 99 % | HEIGHT: 63 IN

## 2025-07-15 DIAGNOSIS — F11.20 OPIOID DEPENDENCE, UNCOMPLICATED: ICD-10-CM

## 2025-07-15 DIAGNOSIS — F90.9 ATTENTION DEFICIT HYPERACTIVITY DISORDER (ADHD), UNSPECIFIED ADHD TYPE: ICD-10-CM

## 2025-07-15 PROCEDURE — 3008F BODY MASS INDEX DOCD: CPT | Mod: CPTII,,, | Performed by: FAMILY MEDICINE

## 2025-07-15 PROCEDURE — 3078F DIAST BP <80 MM HG: CPT | Mod: CPTII,,, | Performed by: FAMILY MEDICINE

## 2025-07-15 PROCEDURE — 3074F SYST BP LT 130 MM HG: CPT | Mod: CPTII,,, | Performed by: FAMILY MEDICINE

## 2025-07-15 PROCEDURE — 99213 OFFICE O/P EST LOW 20 MIN: CPT | Mod: ,,, | Performed by: FAMILY MEDICINE

## 2025-07-15 PROCEDURE — 1159F MED LIST DOCD IN RCRD: CPT | Mod: CPTII,,, | Performed by: FAMILY MEDICINE

## 2025-07-15 RX ORDER — LISDEXAMFETAMINE DIMESYLATE 70 MG/1
70 CAPSULE ORAL EVERY MORNING
Qty: 30 CAPSULE | Refills: 0 | Status: SHIPPED | OUTPATIENT
Start: 2025-07-15 | End: 2025-08-14

## 2025-07-15 RX ORDER — BUPRENORPHINE HYDROCHLORIDE 8 MG/1
TABLET SUBLINGUAL
Qty: 75 EACH | Refills: 2 | Status: SHIPPED | OUTPATIENT
Start: 2025-07-15

## 2025-07-15 NOTE — PROGRESS NOTES
"SUBJECTIVE:  Christen Hawthorne is a 29 y.o. female here for Follow-up (3 mth MAT)      HPI  Patient here for follow-up.  She is doing well on her present dose of buprenorphine.  She is not having any cravings and in fact has cut back a little bit  Janays allergies, medications, history, and problem list were updated as appropriate.    Review of Systems   See HPI.    No results found for this or any previous visit (from the past 3 weeks).    OBJECTIVE:  Vital signs  Vitals:    07/15/25 0836   BP: 104/68   BP Location: Left arm   Patient Position: Sitting   Pulse: (!) 54   Temp: 96.8 °F (36 °C)   TempSrc: Oral   SpO2: 99%   Weight: 50.5 kg (111 lb 6.4 oz)   Height: 5' 2.99" (1.6 m)        Physical Exam normal affect    ASSESSMENT/PLAN:  1. Opioid dependence, uncomplicated  Continue buprenorphine currently just under 20 mg daily  -     buprenorphine HCL (SUBUTEX) 8 mg Subl; SMARTSI.5 Tablet(s) Sublingual Daily  Dispense: 75 each; Refill: 2    2. Attention deficit hyperactivity disorder (ADHD), unspecified ADHD type  Doing well on Vyvanse 70 mg daily.  She has a history of methamphetamine use in the past but has not had any cravings or use  -     lisdexamfetamine (VYVANSE) 70 MG capsule; Take 1 capsule (70 mg total) by mouth every morning.  Dispense: 30 capsule; Refill: 0         Follow Up:  Follow up in about 3 months (around 10/15/2025).            "

## 2025-07-28 ENCOUNTER — TELEPHONE (OUTPATIENT)
Dept: FAMILY MEDICINE | Facility: CLINIC | Age: 30
End: 2025-07-28
Payer: MEDICAID

## 2025-08-01 ENCOUNTER — LAB VISIT (OUTPATIENT)
Dept: LAB | Facility: HOSPITAL | Age: 30
End: 2025-08-01
Payer: MEDICAID

## 2025-08-01 DIAGNOSIS — Z79.899 POLYPHARMACY: ICD-10-CM

## 2025-08-01 DIAGNOSIS — Z00.00 ROUTINE GENERAL MEDICAL EXAMINATION AT A HEALTH CARE FACILITY: Primary | ICD-10-CM

## 2025-08-01 DIAGNOSIS — Z13.29 SCREENING FOR THYROID DISORDER: ICD-10-CM

## 2025-08-01 DIAGNOSIS — Z13.6 SCREENING FOR ISCHEMIC HEART DISEASE: ICD-10-CM

## 2025-08-01 LAB
ALBUMIN SERPL-MCNC: 4.3 G/DL (ref 3.5–5)
ALBUMIN/GLOB SERPL: 1.5 RATIO (ref 1.1–2)
ALP SERPL-CCNC: 90 UNIT/L (ref 40–150)
ALT SERPL-CCNC: 9 UNIT/L (ref 0–55)
ANION GAP SERPL CALC-SCNC: 10 MEQ/L
AST SERPL-CCNC: 14 UNIT/L (ref 11–45)
BASOPHILS # BLD AUTO: 0.02 X10(3)/MCL (ref 0.01–0.08)
BASOPHILS NFR BLD AUTO: 0.3 % (ref 0.1–1.2)
BILIRUB SERPL-MCNC: 0.6 MG/DL
BUN SERPL-MCNC: 6 MG/DL (ref 7–18.7)
CALCIUM SERPL-MCNC: 9.5 MG/DL (ref 8.4–10.2)
CHLORIDE SERPL-SCNC: 107 MMOL/L (ref 98–107)
CHOLEST SERPL-MCNC: 151 MG/DL
CHOLEST/HDLC SERPL: 4 {RATIO} (ref 0–5)
CO2 SERPL-SCNC: 25 MMOL/L (ref 22–29)
CREAT SERPL-MCNC: 0.66 MG/DL (ref 0.55–1.02)
CREAT/UREA NIT SERPL: 9
EOSINOPHIL # BLD AUTO: 0.07 X10(3)/MCL (ref 0.04–0.36)
EOSINOPHIL NFR BLD AUTO: 1 % (ref 0.7–7)
ERYTHROCYTE [DISTWIDTH] IN BLOOD BY AUTOMATED COUNT: 11.1 % (ref 11–14.5)
GFR SERPLBLD CREATININE-BSD FMLA CKD-EPI: >90 ML/MIN/1.73/M2
GLOBULIN SER-MCNC: 2.9 GM/DL (ref 2.4–3.5)
GLUCOSE SERPL-MCNC: 82 MG/DL (ref 74–100)
HCT VFR BLD AUTO: 38 % (ref 36–48)
HDLC SERPL-MCNC: 39 MG/DL (ref 35–60)
HGB BLD-MCNC: 12.7 G/DL (ref 11.8–16)
IMM GRANULOCYTES # BLD AUTO: 0.01 X10(3)/MCL (ref 0–0.03)
IMM GRANULOCYTES NFR BLD AUTO: 0.1 % (ref 0–0.5)
LDLC SERPL CALC-MCNC: 101 MG/DL (ref 50–140)
LYMPHOCYTES # BLD AUTO: 2.63 X10(3)/MCL (ref 1.16–3.74)
LYMPHOCYTES NFR BLD AUTO: 37.4 % (ref 20–55)
MCH RBC QN AUTO: 31.1 PG (ref 27–34)
MCHC RBC AUTO-ENTMCNC: 33.4 G/DL (ref 31–37)
MCV RBC AUTO: 93.1 FL (ref 79–99)
MONOCYTES # BLD AUTO: 0.74 X10(3)/MCL (ref 0.24–0.36)
MONOCYTES NFR BLD AUTO: 10.5 % (ref 4.7–12.5)
NEUTROPHILS # BLD AUTO: 3.57 X10(3)/MCL (ref 1.56–6.13)
NEUTROPHILS NFR BLD AUTO: 50.7 % (ref 37–73)
NRBC BLD AUTO-RTO: 0 %
PLATELET # BLD AUTO: 353 X10(3)/MCL (ref 140–371)
PMV BLD AUTO: 9.9 FL (ref 9.4–12.4)
POTASSIUM SERPL-SCNC: 4.1 MMOL/L (ref 3.5–5.1)
PROT SERPL-MCNC: 7.2 GM/DL (ref 6.4–8.3)
RBC # BLD AUTO: 4.08 X10(6)/MCL (ref 4–5.1)
SODIUM SERPL-SCNC: 142 MMOL/L (ref 136–145)
TRIGL SERPL-MCNC: 56 MG/DL (ref 37–140)
TSH SERPL-ACNC: 1.41 UIU/ML (ref 0.35–4.94)
VLDLC SERPL CALC-MCNC: 11 MG/DL
WBC # BLD AUTO: 7.04 X10(3)/MCL (ref 4–11.5)

## 2025-08-01 PROCEDURE — 84443 ASSAY THYROID STIM HORMONE: CPT

## 2025-08-01 PROCEDURE — 80061 LIPID PANEL: CPT

## 2025-08-01 PROCEDURE — 36415 COLL VENOUS BLD VENIPUNCTURE: CPT

## 2025-08-01 PROCEDURE — 80053 COMPREHEN METABOLIC PANEL: CPT

## 2025-08-01 PROCEDURE — 85025 COMPLETE CBC W/AUTO DIFF WBC: CPT

## 2025-08-13 DIAGNOSIS — F90.9 ATTENTION DEFICIT HYPERACTIVITY DISORDER (ADHD), UNSPECIFIED ADHD TYPE: ICD-10-CM

## 2025-08-13 RX ORDER — LISDEXAMFETAMINE DIMESYLATE 70 MG/1
70 CAPSULE ORAL EVERY MORNING
Qty: 30 CAPSULE | Refills: 0 | Status: SHIPPED | OUTPATIENT
Start: 2025-08-13